# Patient Record
Sex: FEMALE | Race: WHITE | Employment: OTHER | URBAN - METROPOLITAN AREA
[De-identification: names, ages, dates, MRNs, and addresses within clinical notes are randomized per-mention and may not be internally consistent; named-entity substitution may affect disease eponyms.]

---

## 2017-06-12 PROCEDURE — 88175 CYTOPATH C/V AUTO FLUID REDO: CPT | Performed by: FAMILY MEDICINE

## 2017-06-12 PROCEDURE — 82607 VITAMIN B-12: CPT | Performed by: FAMILY MEDICINE

## 2017-06-12 PROCEDURE — 87624 HPV HI-RISK TYP POOLED RSLT: CPT | Performed by: FAMILY MEDICINE

## 2017-09-26 ENCOUNTER — TELEPHONE (OUTPATIENT)
Dept: FAMILY MEDICINE CLINIC | Facility: CLINIC | Age: 57
End: 2017-09-26

## 2017-09-26 NOTE — TELEPHONE ENCOUNTER
Chart reviewed for upcoming appointment at [facility]  Future Appointments  Date Time Provider Angélica Marina   10/12/2017 2:00 PM PATI VásquezWEANNIE EM[facility]   11/8/2017 3:40 PM PATI Vásquez EMGWEANNIE [facility]

## 2017-10-12 ENCOUNTER — OFFICE VISIT (OUTPATIENT)
Dept: INTERNAL MEDICINE CLINIC | Facility: CLINIC | Age: 57
End: 2017-10-12

## 2017-10-12 VITALS
SYSTOLIC BLOOD PRESSURE: 120 MMHG | HEART RATE: 80 BPM | HEIGHT: 63.5 IN | BODY MASS INDEX: 42.87 KG/M2 | DIASTOLIC BLOOD PRESSURE: 86 MMHG | WEIGHT: 245 LBS | RESPIRATION RATE: 16 BRPM

## 2017-10-12 DIAGNOSIS — F32.A ANXIETY AND DEPRESSION: ICD-10-CM

## 2017-10-12 DIAGNOSIS — F41.9 ANXIETY AND DEPRESSION: ICD-10-CM

## 2017-10-12 DIAGNOSIS — E55.9 VITAMIN D DEFICIENCY: ICD-10-CM

## 2017-10-12 DIAGNOSIS — I10 ESSENTIAL HYPERTENSION, BENIGN: ICD-10-CM

## 2017-10-12 DIAGNOSIS — R73.01 IFG (IMPAIRED FASTING GLUCOSE): ICD-10-CM

## 2017-10-12 DIAGNOSIS — E53.8 LOW VITAMIN B12 LEVEL: ICD-10-CM

## 2017-10-12 DIAGNOSIS — R53.82 CHRONIC FATIGUE: ICD-10-CM

## 2017-10-12 DIAGNOSIS — E66.01 MORBID OBESITY WITH BMI OF 40.0-44.9, ADULT (HCC): ICD-10-CM

## 2017-10-12 DIAGNOSIS — G47.33 OSA ON CPAP: ICD-10-CM

## 2017-10-12 DIAGNOSIS — Z99.89 OSA ON CPAP: ICD-10-CM

## 2017-10-12 DIAGNOSIS — R01.1 HEART MURMUR: ICD-10-CM

## 2017-10-12 DIAGNOSIS — R93.1 ABNORMAL ECHOCARDIOGRAM: ICD-10-CM

## 2017-10-12 DIAGNOSIS — Z51.81 ENCOUNTER FOR THERAPEUTIC DRUG MONITORING: Primary | ICD-10-CM

## 2017-10-12 PROCEDURE — 99203 OFFICE O/P NEW LOW 30 MIN: CPT | Performed by: NURSE PRACTITIONER

## 2017-10-12 PROCEDURE — 93000 ELECTROCARDIOGRAM COMPLETE: CPT | Performed by: NURSE PRACTITIONER

## 2017-10-12 NOTE — PATIENT INSTRUCTIONS
Welcome to the [facility]. ..your Lifestyle Renovation begins now! Thank you for placing your trust in our health care team, I look forward to working with you along this journey to better health!     Next steps:     1.  Schedule a personal n little time commitment. Don't forget to schedule your 1 month follow-up visit!

## 2017-10-12 NOTE — PROGRESS NOTES
Pio Florentino is a 62year old female new to our office today. Referred by daughter- previous/current patient of [facility]. S:  Patient presents today with c/o weight staring after 2nd pregnancy.   Patient sees excess weight as having a severe effect on hea 80   Resp 16   Ht 63.5\"   Wt 245 lb   LMP 09/01/2010   BMI 42.72 kg/m² , Percent body fat: F >32% 48%  GENERAL: well developed, well nourished, in no apparent distress, morbid obesity  SKIN: warm, pink, dry without rashes to exposed area  EYES: conjunctiv CARD ECHO 2D DOPPLER (CPT=93306); Future    5. Chronic fatigue  - check labs  - LEPTIN, SERUM; Future  - HEMOGLOBIN A1C  - VITAMIN B12  - OP REFERRAL TO [facility]  - DIETITIAN EDUCATION INITIAL, DIET (INTERNAL)    6.  Abnormal echocardiogram  - hx of elevate work on the following dietary changes this first month:    1. Drink water with meals and throughout the day, cut down on soda and/or juice if consumed.   2.  Eat breakfast daily and focus on having protein with each meal, examples include: greek yogurt, co

## 2017-10-18 ENCOUNTER — HOSPITAL ENCOUNTER (OUTPATIENT)
Dept: CV DIAGNOSTICS | Facility: HOSPITAL | Age: 57
Discharge: HOME | End: 2017-10-18
Attending: NURSE PRACTITIONER
Payer: MEDICARE

## 2017-10-18 DIAGNOSIS — Z99.89 OSA ON CPAP: ICD-10-CM

## 2017-10-18 DIAGNOSIS — G47.33 OSA ON CPAP: ICD-10-CM

## 2017-10-18 DIAGNOSIS — R93.1 ABNORMAL ECHOCARDIOGRAM: ICD-10-CM

## 2017-10-18 DIAGNOSIS — R01.1 HEART MURMUR: ICD-10-CM

## 2017-10-18 PROCEDURE — 93306 TTE W/DOPPLER COMPLETE: CPT | Performed by: NURSE PRACTITIONER

## 2017-10-24 ENCOUNTER — OFFICE VISIT (OUTPATIENT)
Dept: INTERNAL MEDICINE CLINIC | Facility: CLINIC | Age: 57
End: 2017-10-24

## 2017-10-24 VITALS — BODY MASS INDEX: 43 KG/M2 | WEIGHT: 245 LBS

## 2017-10-24 DIAGNOSIS — E66.01 OBESITY, CLASS III, BMI 40-49.9 (MORBID OBESITY) (HCC): ICD-10-CM

## 2017-10-24 PROCEDURE — 97802 MEDICAL NUTRITION INDIV IN: CPT | Performed by: DIETITIAN, REGISTERED

## 2017-10-25 NOTE — PROGRESS NOTES
INITIAL OUTPATIENT NUTRITION CONSULTATION    Nutrition Assessment    Medical Diagnosis: Obesity    Physical Findings: fatigue, chronic pain, failed knee replacement    Client Age and Gender: 62year old female    Marital Status and Occupation: Separate TABLET BY MOUTH ONCE DAILY Disp: 90 tablet Rfl: 3   naproxen 500 MG Oral Tab TAKE ONE TABLET BY  DAILY WITH MEALS. Disp: 30 tablet Rfl: 2   Meclizine HCl 25 MG Oral Tab Take 1 tablet (25 mg total) by mouth 3 (three) times daily as needed.  Disp: 45 tablet R Intake: No diet record  Breakfast: Skips due to waking after 10 am  Lunch: Varies. Did not specify  Dinner: [employer] [place]. Yesterday 1/2 kait at [facility].   Prior day ribs and pizza  Snacks: none mentioned  Beverages: Diet Pepsi, coffee with sug

## 2017-11-07 ENCOUNTER — OFFICE VISIT (OUTPATIENT)
Dept: INTERNAL MEDICINE CLINIC | Facility: CLINIC | Age: 57
End: 2017-11-07

## 2017-11-07 VITALS
TEMPERATURE: 80 F | BODY MASS INDEX: 42.35 KG/M2 | WEIGHT: 242 LBS | HEIGHT: 63.5 IN | SYSTOLIC BLOOD PRESSURE: 120 MMHG | DIASTOLIC BLOOD PRESSURE: 88 MMHG | RESPIRATION RATE: 16 BRPM

## 2017-11-07 DIAGNOSIS — Z51.81 ENCOUNTER FOR THERAPEUTIC DRUG MONITORING: Primary | ICD-10-CM

## 2017-11-07 DIAGNOSIS — G43.909 MIGRAINE WITHOUT STATUS MIGRAINOSUS, NOT INTRACTABLE, UNSPECIFIED MIGRAINE TYPE: ICD-10-CM

## 2017-11-07 DIAGNOSIS — E66.01 MORBID OBESITY WITH BMI OF 40.0-44.9, ADULT (HCC): ICD-10-CM

## 2017-11-07 DIAGNOSIS — Z98.84 S/P GASTRIC BYPASS: ICD-10-CM

## 2017-11-07 DIAGNOSIS — I10 ESSENTIAL HYPERTENSION, BENIGN: ICD-10-CM

## 2017-11-07 DIAGNOSIS — G89.4 CHRONIC PAIN SYNDROME: ICD-10-CM

## 2017-11-07 PROCEDURE — 99214 OFFICE O/P EST MOD 30 MIN: CPT | Performed by: NURSE PRACTITIONER

## 2017-11-07 RX ORDER — TOPIRAMATE 25 MG/1
25 TABLET ORAL 2 TIMES DAILY
Qty: 60 TABLET | Refills: 0 | Status: SHIPPED | OUTPATIENT
Start: 2017-11-07 | End: 2017-12-12

## 2017-11-07 NOTE — PATIENT INSTRUCTIONS
Congratulations on your 3# weight loss! Continue making lifestyle changes that focus on good nutrition, regular exercise and stress management.     Diagnostic results: cardiac echo without change since last test    Medication Plan: Continue Metformin at cur

## 2017-11-07 NOTE — PROGRESS NOTES
Pedrito Salgado is a 62year old female presents today for 1 month follow-up on medical weight loss program for the treatment of overweight, obesity, or morbid obesity with HTN, OA knees, migraines, hx of RYGB.     S:  Current weight Wt Readings from Last 40.0-44.9, adult (hcc)  Essential hypertension, benign  Migraine without status migrainosus, not intractable, unspecified migraine type  Chronic pain syndrome  S/p gastric bypass    No orders of the defined types were placed in this encounter.       Meds & www.CityHeroes.tenfarms, www.QirefitSpotMe. tenfarms  · 360FIT [place]    Online  · Fitness  on Paperhater.com in 10 DVD series   www. ShareSquare. tenfarms  · Sit and Be Fit - Chair exercise series www.sitandbefit. org      Apps  · [employer] - on the go grou

## 2017-12-12 ENCOUNTER — OFFICE VISIT (OUTPATIENT)
Dept: INTERNAL MEDICINE CLINIC | Facility: CLINIC | Age: 57
End: 2017-12-12

## 2017-12-12 VITALS
WEIGHT: 241 LBS | BODY MASS INDEX: 42.17 KG/M2 | HEART RATE: 68 BPM | DIASTOLIC BLOOD PRESSURE: 80 MMHG | RESPIRATION RATE: 16 BRPM | SYSTOLIC BLOOD PRESSURE: 130 MMHG | HEIGHT: 63.5 IN

## 2017-12-12 DIAGNOSIS — G43.909 MIGRAINE WITHOUT STATUS MIGRAINOSUS, NOT INTRACTABLE, UNSPECIFIED MIGRAINE TYPE: ICD-10-CM

## 2017-12-12 DIAGNOSIS — Z51.81 ENCOUNTER FOR THERAPEUTIC DRUG MONITORING: Primary | ICD-10-CM

## 2017-12-12 DIAGNOSIS — E66.01 MORBID OBESITY WITH BMI OF 40.0-44.9, ADULT (HCC): ICD-10-CM

## 2017-12-12 PROCEDURE — 99213 OFFICE O/P EST LOW 20 MIN: CPT | Performed by: NURSE PRACTITIONER

## 2017-12-12 RX ORDER — TOPIRAMATE 25 MG/1
25 TABLET ORAL 2 TIMES DAILY
Qty: 60 TABLET | Refills: 0 | Status: SHIPPED | OUTPATIENT
Start: 2017-12-12 | End: 2018-05-07 | Stop reason: ALTCHOICE

## 2017-12-12 NOTE — PROGRESS NOTES
Yovanny Rasmussen is a 62year old female presents today for 2 month follow-up on medical weight loss program for the treatment of overweight, obesity, or morbid obesity with HTN, OA knees, migraines, hx of RYGB.     S:  Current weight Wt Readings from Last Morbid obesity with bmi of 40.0-44.9, adult (hcc)  Migraine without status migrainosus, not intractable, unspecified migraine type    No orders of the defined types were placed in this encounter.       Meds & Refills for this Visit:  Signed Prescriptions Sue Barber A good rule of thumb: Devote half your plate to vegetables and green salad. Split the other half between protein and starchy carbohydrates. Fruit makes a good dessert. Servings and portions. What’s the difference? These terms can be very confusing.  But If your weight is a concern, reducing your portions can help. You can eat more than one serving of a food at once. But to keep from eating too much at one meal, learn how to manage your portions. A portion is the amount of each type of food on your plate. Try not to put off eating for too long. Waiting until level 1 or 2 — when you are starving and unable to concentrate — may lead to overeating. When you first start to feel any of the symptoms listed above, you should probably start to think about eating.

## 2017-12-12 NOTE — PATIENT INSTRUCTIONS
Congratulations on your 1# weight loss! Continue making lifestyle changes that focus on good nutrition, regular exercise and stress management. Medication Plan: Continue Metformin at current dose. Add Topamax.  Start Topamax at 1 tab daily before breakfa One teaspoon is about the size of the tip of your thumb. Keeping track of serving sizes  When you’re planning for a snack or a meal, keep servings in mind.  If you don’t have measuring cups or a scale handy, there are ways to [place] serving sizes, such Low energy/fatigue   Difficulty concentrating  How Does the Scale Work? Before you eat, take a moment to rate your hunger. Think about how hungry you physically feel. Your goal is to eat between levels 4 and 6.  This means you are eating when you are hungr

## 2018-12-19 PROBLEM — M25.512 CHRONIC LEFT SHOULDER PAIN: Status: ACTIVE | Noted: 2018-12-19

## 2018-12-19 PROBLEM — G89.29 CHRONIC LEFT SHOULDER PAIN: Status: ACTIVE | Noted: 2018-12-19

## 2018-12-19 PROBLEM — M79.602 PAIN OF LEFT UPPER EXTREMITY: Status: ACTIVE | Noted: 2018-12-19

## 2019-05-19 PROCEDURE — 87086 URINE CULTURE/COLONY COUNT: CPT | Performed by: FAMILY MEDICINE

## 2020-01-03 PROBLEM — I70.0 ATHEROSCLEROSIS OF AORTA (HCC): Status: ACTIVE | Noted: 2020-01-03

## 2020-01-03 PROBLEM — I70.0 ATHEROSCLEROSIS OF AORTA: Status: ACTIVE | Noted: 2020-01-03

## 2020-10-28 PROBLEM — R43.9 DYSOSMIA: Status: ACTIVE | Noted: 2020-10-28

## 2021-02-05 PROBLEM — I27.21 PAH (PULMONARY ARTERY HYPERTENSION) (HCC): Status: ACTIVE | Noted: 2021-02-05

## 2021-02-18 PROBLEM — G89.29 CHRONIC BILATERAL THORACIC BACK PAIN: Status: ACTIVE | Noted: 2021-02-18

## 2021-02-18 PROBLEM — M54.6 CHRONIC BILATERAL THORACIC BACK PAIN: Status: ACTIVE | Noted: 2021-02-18

## 2021-02-18 PROBLEM — M47.816 LUMBAR FACET ARTHROPATHY: Status: ACTIVE | Noted: 2021-02-18

## 2021-03-01 ENCOUNTER — OFFICE VISIT (OUTPATIENT)
Dept: FAMILY MEDICINE CLINIC | Facility: CLINIC | Age: 61
End: 2021-03-01
Payer: MEDICARE

## 2021-03-01 VITALS
BODY MASS INDEX: 40.75 KG/M2 | SYSTOLIC BLOOD PRESSURE: 138 MMHG | OXYGEN SATURATION: 98 % | DIASTOLIC BLOOD PRESSURE: 70 MMHG | RESPIRATION RATE: 18 BRPM | WEIGHT: 230 LBS | HEIGHT: 63 IN | HEART RATE: 70 BPM | TEMPERATURE: 98 F

## 2021-03-01 DIAGNOSIS — Z20.822 SUSPECTED COVID-19 VIRUS INFECTION: ICD-10-CM

## 2021-03-01 DIAGNOSIS — J02.9 SORE THROAT: Primary | ICD-10-CM

## 2021-03-01 LAB
CONTROL LINE PRESENT WITH A CLEAR BACKGROUND (YES/NO): PRESENT YES/NO
KIT LOT #: NORMAL NUMERIC
STREP GRP A CUL-SCR: NEGATIVE

## 2021-03-01 PROCEDURE — 87880 STREP A ASSAY W/OPTIC: CPT | Performed by: NURSE PRACTITIONER

## 2021-03-01 PROCEDURE — 99213 OFFICE O/P EST LOW 20 MIN: CPT | Performed by: NURSE PRACTITIONER

## 2021-03-01 NOTE — PATIENT INSTRUCTIONS
1. Rest. Drink plenty of fluids. 2. Tylenol/Ibuprofen for pain/fevers. 3. Salt water gargles three times daily  4. Use humidifier at home when possible. 5. The rapid strep test was negative today.  We will send a throat culture to lab and call you with rony treatment for you. The evaluation may include a health history, physical exam, and diagnostic tests. Health history  Your healthcare provider may ask you the following:   · How long has the sore throat lasted and how have you been treating it?   · Do you air moist and relieve throat dryness. · Try over-the-counter pain relievers such as acetaminophen or ibuprofen. Use as directed, and don’t exceed the recommended dose. Don’t give aspirin to children under age19. Are antibiotics needed?   If your sore th immediately. Any of these could signal an allergic reaction to antibiotics. · Symptoms don’t improve within a week. · Symptoms don’t improve within  2 to 3  days of starting antibiotics.   Call 911  Call 911 if any of the following occur:   · Trouble rudy

## 2021-03-01 NOTE — PROGRESS NOTES
Maximiliano Geronimo CHIEF COMPLAINT:   Patient presents with:  Cough: x 4 days. No fevers. Runny nose. Body aches      HPI:   Armida Hale is a 64year old female who presents for covid-19 testing. Patient reports cough, runny nose, and body aches x 4 days.   Denies any • Valvular disease     pt states leaking valves /no problems   • Vertigo       Past Surgical History:   Procedure Laterality Date   • APPENDECTOMY  1962   • CERVICAL EPIDURAL N/A 5/13/2016    Performed by Carlos Chacon MD at [address] [place] [address] Performed by Judi Mccord MD at [address] [place] [address] History    Tobacco Use      Smoking status: Never Smoker      Smokeless tobacco: Never Used    Alcohol use: Not Currently      Alcohol/week: 1.0 - 2.0 standard drinks      Ty Rapid strep negative. Discussed physical exam and hpi with pt. Pt has reassuring physical exam consistent with pharyngitis and mild viral uri symptoms. Lungs clear bilat. No respiratory distress noted.  We discussed covid-19 vs strep vs other etiologi · Irritants such as tobacco smoke or air pollution  · Acid reflux  A healthy throat  The tonsils are on the sides of the throat near the base of the tongue. They collect viruses and bacteria and help fight infection.  The throat (pharynx) is the passage for Treating a sore throat  Treatment depends on many factors. What is the likely cause? Is the problem recent? Does it keep coming back? In many cases, the best thing to do is to treat the symptoms, rest, and let the problem heal itself.  Antibiotics may help In some cases, tonsils need to be removed. This is often done as outpatient (same-day) surgery. Your healthcare provider may advise removing the tonsils in cases of:   · Several severe bouts of tonsillitis in a year.  “Severe” episodes include those that le

## 2021-03-03 LAB — SARS-COV-2 RNA RESP QL NAA+PROBE: NOT DETECTED

## 2021-05-21 ENCOUNTER — APPOINTMENT (OUTPATIENT)
Dept: GENERAL RADIOLOGY | Facility: HOSPITAL | Age: 61
End: 2021-05-21
Attending: INTERNAL MEDICINE
Payer: MEDICARE

## 2021-05-21 ENCOUNTER — APPOINTMENT (OUTPATIENT)
Dept: CT IMAGING | Facility: HOSPITAL | Age: 61
End: 2021-05-21
Attending: EMERGENCY MEDICINE
Payer: MEDICARE

## 2021-05-21 ENCOUNTER — ANESTHESIA (OUTPATIENT)
Dept: SURGERY | Facility: HOSPITAL | Age: 61
End: 2021-05-21
Payer: MEDICARE

## 2021-05-21 ENCOUNTER — ANESTHESIA EVENT (OUTPATIENT)
Dept: SURGERY | Facility: HOSPITAL | Age: 61
End: 2021-05-21
Payer: MEDICARE

## 2021-05-21 ENCOUNTER — HOSPITAL ENCOUNTER (OUTPATIENT)
Facility: HOSPITAL | Age: 61
Setting detail: OBSERVATION
Discharge: HOME | End: 2021-05-22
Attending: EMERGENCY MEDICINE | Admitting: INTERNAL MEDICINE
Payer: MEDICARE

## 2021-05-21 DIAGNOSIS — N23 RENAL COLIC: Primary | ICD-10-CM

## 2021-05-21 DIAGNOSIS — N20.1 URETERAL CALCULI: ICD-10-CM

## 2021-05-21 PROCEDURE — 82365 CALCULUS SPECTROSCOPY: CPT | Performed by: UROLOGY

## 2021-05-21 PROCEDURE — 96374 THER/PROPH/DIAG INJ IV PUSH: CPT

## 2021-05-21 PROCEDURE — 96376 TX/PRO/DX INJ SAME DRUG ADON: CPT

## 2021-05-21 PROCEDURE — 99285 EMERGENCY DEPT VISIT HI MDM: CPT

## 2021-05-21 PROCEDURE — 74176 CT ABD & PELVIS W/O CONTRAST: CPT | Performed by: EMERGENCY MEDICINE

## 2021-05-21 PROCEDURE — 80053 COMPREHEN METABOLIC PANEL: CPT | Performed by: EMERGENCY MEDICINE

## 2021-05-21 PROCEDURE — 0T778DZ DILATION OF LEFT URETER WITH INTRALUMINAL DEVICE, VIA NATURAL OR ARTIFICIAL OPENING ENDOSCOPIC: ICD-10-PCS | Performed by: UROLOGY

## 2021-05-21 PROCEDURE — 88300 SURGICAL PATH GROSS: CPT | Performed by: INTERNAL MEDICINE

## 2021-05-21 PROCEDURE — 85025 COMPLETE CBC W/AUTO DIFF WBC: CPT | Performed by: EMERGENCY MEDICINE

## 2021-05-21 PROCEDURE — 93005 ELECTROCARDIOGRAM TRACING: CPT

## 2021-05-21 PROCEDURE — S0028 INJECTION, FAMOTIDINE, 20 MG: HCPCS | Performed by: EMERGENCY MEDICINE

## 2021-05-21 PROCEDURE — 96361 HYDRATE IV INFUSION ADD-ON: CPT

## 2021-05-21 PROCEDURE — 81001 URINALYSIS AUTO W/SCOPE: CPT | Performed by: EMERGENCY MEDICINE

## 2021-05-21 PROCEDURE — 0TC78ZZ EXTIRPATION OF MATTER FROM LEFT URETER, VIA NATURAL OR ARTIFICIAL OPENING ENDOSCOPIC: ICD-10-PCS | Performed by: UROLOGY

## 2021-05-21 PROCEDURE — 87086 URINE CULTURE/COLONY COUNT: CPT | Performed by: EMERGENCY MEDICINE

## 2021-05-21 PROCEDURE — 96375 TX/PRO/DX INJ NEW DRUG ADDON: CPT

## 2021-05-21 PROCEDURE — 93010 ELECTROCARDIOGRAM REPORT: CPT

## 2021-05-21 DEVICE — URETERAL STENT
Type: IMPLANTABLE DEVICE | Site: URETER | Status: FUNCTIONAL
Brand: ASCERTA™

## 2021-05-21 RX ORDER — METOCLOPRAMIDE HYDROCHLORIDE 5 MG/ML
10 INJECTION INTRAMUSCULAR; INTRAVENOUS AS NEEDED
Status: DISCONTINUED | OUTPATIENT
Start: 2021-05-21 | End: 2021-05-21 | Stop reason: HOSPADM

## 2021-05-21 RX ORDER — KETOROLAC TROMETHAMINE 30 MG/ML
15 INJECTION, SOLUTION INTRAMUSCULAR; INTRAVENOUS ONCE
Status: COMPLETED | OUTPATIENT
Start: 2021-05-21 | End: 2021-05-21

## 2021-05-21 RX ORDER — ONDANSETRON 2 MG/ML
4 INJECTION INTRAMUSCULAR; INTRAVENOUS AS NEEDED
Status: DISCONTINUED | OUTPATIENT
Start: 2021-05-21 | End: 2021-05-21 | Stop reason: HOSPADM

## 2021-05-21 RX ORDER — ATENOLOL 50 MG/1
50 TABLET ORAL NIGHTLY
Status: DISCONTINUED | OUTPATIENT
Start: 2021-05-21 | End: 2021-05-22

## 2021-05-21 RX ORDER — SODIUM CHLORIDE, SODIUM LACTATE, POTASSIUM CHLORIDE, CALCIUM CHLORIDE 600; 310; 30; 20 MG/100ML; MG/100ML; MG/100ML; MG/100ML
INJECTION, SOLUTION INTRAVENOUS CONTINUOUS
Status: DISCONTINUED | OUTPATIENT
Start: 2021-05-21 | End: 2021-05-21 | Stop reason: HOSPADM

## 2021-05-21 RX ORDER — LIDOCAINE HYDROCHLORIDE 10 MG/ML
INJECTION, SOLUTION EPIDURAL; INFILTRATION; INTRACAUDAL; PERINEURAL AS NEEDED
Status: DISCONTINUED | OUTPATIENT
Start: 2021-05-21 | End: 2021-05-21 | Stop reason: SURG

## 2021-05-21 RX ORDER — SODIUM CHLORIDE 9 MG/ML
INJECTION, SOLUTION INTRAVENOUS CONTINUOUS
Status: DISCONTINUED | OUTPATIENT
Start: 2021-05-21 | End: 2021-05-22

## 2021-05-21 RX ORDER — PROCHLORPERAZINE EDISYLATE 5 MG/ML
5 INJECTION INTRAMUSCULAR; INTRAVENOUS EVERY 8 HOURS PRN
Status: DISCONTINUED | OUTPATIENT
Start: 2021-05-21 | End: 2021-05-22

## 2021-05-21 RX ORDER — ATROPINE SULFATE 0.4 MG/ML
AMPUL (ML) INJECTION AS NEEDED
Status: DISCONTINUED | OUTPATIENT
Start: 2021-05-21 | End: 2021-05-21 | Stop reason: SURG

## 2021-05-21 RX ORDER — HYDROMORPHONE HYDROCHLORIDE 1 MG/ML
0.5 INJECTION, SOLUTION INTRAMUSCULAR; INTRAVENOUS; SUBCUTANEOUS EVERY 30 MIN PRN
Status: DISCONTINUED | OUTPATIENT
Start: 2021-05-21 | End: 2021-05-21 | Stop reason: HOSPADM

## 2021-05-21 RX ORDER — HYDROCODONE BITARTRATE AND ACETAMINOPHEN 5; 325 MG/1; MG/1
2 TABLET ORAL AS NEEDED
Status: DISCONTINUED | OUTPATIENT
Start: 2021-05-21 | End: 2021-05-21 | Stop reason: HOSPADM

## 2021-05-21 RX ORDER — MEPERIDINE HYDROCHLORIDE 25 MG/ML
12.5 INJECTION INTRAMUSCULAR; INTRAVENOUS; SUBCUTANEOUS AS NEEDED
Status: DISCONTINUED | OUTPATIENT
Start: 2021-05-21 | End: 2021-05-21 | Stop reason: HOSPADM

## 2021-05-21 RX ORDER — KETOROLAC TROMETHAMINE 30 MG/ML
30 INJECTION, SOLUTION INTRAMUSCULAR; INTRAVENOUS EVERY 6 HOURS PRN
Status: DISCONTINUED | OUTPATIENT
Start: 2021-05-21 | End: 2021-05-21

## 2021-05-21 RX ORDER — BISACODYL 10 MG
10 SUPPOSITORY, RECTAL RECTAL
Status: DISCONTINUED | OUTPATIENT
Start: 2021-05-21 | End: 2021-05-22

## 2021-05-21 RX ORDER — HYDROCODONE BITARTRATE AND ACETAMINOPHEN 5; 325 MG/1; MG/1
1 TABLET ORAL AS NEEDED
Status: DISCONTINUED | OUTPATIENT
Start: 2021-05-21 | End: 2021-05-21 | Stop reason: HOSPADM

## 2021-05-21 RX ORDER — ROCURONIUM BROMIDE 10 MG/ML
INJECTION, SOLUTION INTRAVENOUS AS NEEDED
Status: DISCONTINUED | OUTPATIENT
Start: 2021-05-21 | End: 2021-05-21 | Stop reason: SURG

## 2021-05-21 RX ORDER — HYDROMORPHONE HYDROCHLORIDE 1 MG/ML
0.4 INJECTION, SOLUTION INTRAMUSCULAR; INTRAVENOUS; SUBCUTANEOUS EVERY 2 HOUR PRN
Status: DISCONTINUED | OUTPATIENT
Start: 2021-05-21 | End: 2021-05-22

## 2021-05-21 RX ORDER — POLYETHYLENE GLYCOL 3350 17 G/17G
17 POWDER, FOR SOLUTION ORAL DAILY PRN
Status: DISCONTINUED | OUTPATIENT
Start: 2021-05-21 | End: 2021-05-22

## 2021-05-21 RX ORDER — MULTIVIT/IRON SULF/FOLIC ACID 15MG-0.4MG
1 TABLET ORAL DAILY
COMMUNITY
End: 2021-11-04 | Stop reason: ALTCHOICE

## 2021-05-21 RX ORDER — HYDROMORPHONE HYDROCHLORIDE 1 MG/ML
0.4 INJECTION, SOLUTION INTRAMUSCULAR; INTRAVENOUS; SUBCUTANEOUS EVERY 5 MIN PRN
Status: DISCONTINUED | OUTPATIENT
Start: 2021-05-21 | End: 2021-05-21 | Stop reason: HOSPADM

## 2021-05-21 RX ORDER — CEFAZOLIN SODIUM/WATER 2 G/20 ML
SYRINGE (ML) INTRAVENOUS AS NEEDED
Status: DISCONTINUED | OUTPATIENT
Start: 2021-05-21 | End: 2021-05-21 | Stop reason: SURG

## 2021-05-21 RX ORDER — TOPIRAMATE 25 MG/1
25 TABLET ORAL DAILY
Status: DISCONTINUED | OUTPATIENT
Start: 2021-05-21 | End: 2021-05-22

## 2021-05-21 RX ORDER — ONDANSETRON 2 MG/ML
4 INJECTION INTRAMUSCULAR; INTRAVENOUS EVERY 6 HOURS PRN
Status: DISCONTINUED | OUTPATIENT
Start: 2021-05-21 | End: 2021-05-22

## 2021-05-21 RX ORDER — GLYCOPYRROLATE 0.2 MG/ML
INJECTION, SOLUTION INTRAMUSCULAR; INTRAVENOUS AS NEEDED
Status: DISCONTINUED | OUTPATIENT
Start: 2021-05-21 | End: 2021-05-21 | Stop reason: SURG

## 2021-05-21 RX ORDER — SODIUM PHOSPHATE, DIBASIC AND SODIUM PHOSPHATE, MONOBASIC 7; 19 G/133ML; G/133ML
1 ENEMA RECTAL ONCE AS NEEDED
Status: DISCONTINUED | OUTPATIENT
Start: 2021-05-21 | End: 2021-05-22

## 2021-05-21 RX ORDER — MIDAZOLAM HYDROCHLORIDE 1 MG/ML
1 INJECTION INTRAMUSCULAR; INTRAVENOUS EVERY 5 MIN PRN
Status: DISCONTINUED | OUTPATIENT
Start: 2021-05-21 | End: 2021-05-21 | Stop reason: HOSPADM

## 2021-05-21 RX ORDER — MORPHINE SULFATE 2 MG/ML
2 INJECTION, SOLUTION INTRAMUSCULAR; INTRAVENOUS ONCE
Status: COMPLETED | OUTPATIENT
Start: 2021-05-21 | End: 2021-05-21

## 2021-05-21 RX ORDER — NEOSTIGMINE METHYLSULFATE 1 MG/ML
INJECTION INTRAVENOUS AS NEEDED
Status: DISCONTINUED | OUTPATIENT
Start: 2021-05-21 | End: 2021-05-21 | Stop reason: SURG

## 2021-05-21 RX ORDER — HYDROMORPHONE HYDROCHLORIDE 1 MG/ML
0.8 INJECTION, SOLUTION INTRAMUSCULAR; INTRAVENOUS; SUBCUTANEOUS EVERY 2 HOUR PRN
Status: DISCONTINUED | OUTPATIENT
Start: 2021-05-21 | End: 2021-05-22

## 2021-05-21 RX ORDER — HYDROMORPHONE HYDROCHLORIDE 1 MG/ML
0.2 INJECTION, SOLUTION INTRAMUSCULAR; INTRAVENOUS; SUBCUTANEOUS EVERY 2 HOUR PRN
Status: DISCONTINUED | OUTPATIENT
Start: 2021-05-21 | End: 2021-05-22

## 2021-05-21 RX ORDER — MORPHINE SULFATE 2 MG/ML
2 INJECTION, SOLUTION INTRAMUSCULAR; INTRAVENOUS
Status: DISCONTINUED | OUTPATIENT
Start: 2021-05-21 | End: 2021-05-22

## 2021-05-21 RX ORDER — NALOXONE HYDROCHLORIDE 0.4 MG/ML
80 INJECTION, SOLUTION INTRAMUSCULAR; INTRAVENOUS; SUBCUTANEOUS AS NEEDED
Status: DISCONTINUED | OUTPATIENT
Start: 2021-05-21 | End: 2021-05-21 | Stop reason: HOSPADM

## 2021-05-21 RX ORDER — DEXAMETHASONE SODIUM PHOSPHATE 4 MG/ML
8 VIAL (ML) INJECTION AS NEEDED
Status: DISCONTINUED | OUTPATIENT
Start: 2021-05-21 | End: 2021-05-21 | Stop reason: HOSPADM

## 2021-05-21 RX ORDER — EPHEDRINE SULFATE 50 MG/ML
INJECTION INTRAVENOUS AS NEEDED
Status: DISCONTINUED | OUTPATIENT
Start: 2021-05-21 | End: 2021-05-21 | Stop reason: SURG

## 2021-05-21 RX ORDER — CYCLOBENZAPRINE HCL 10 MG
10 TABLET ORAL NIGHTLY PRN
COMMUNITY
End: 2021-07-08

## 2021-05-21 RX ORDER — CEFAZOLIN SODIUM/WATER 2 G/20 ML
2 SYRINGE (ML) INTRAVENOUS
Status: COMPLETED | OUTPATIENT
Start: 2021-05-21 | End: 2021-05-21

## 2021-05-21 RX ORDER — FAMOTIDINE 10 MG/ML
20 INJECTION, SOLUTION INTRAVENOUS ONCE
Status: COMPLETED | OUTPATIENT
Start: 2021-05-21 | End: 2021-05-21

## 2021-05-21 RX ADMIN — LIDOCAINE HYDROCHLORIDE 50 MG: 10 INJECTION, SOLUTION EPIDURAL; INFILTRATION; INTRACAUDAL; PERINEURAL at 18:01:00

## 2021-05-21 RX ADMIN — EPHEDRINE SULFATE 10 MG: 50 INJECTION INTRAVENOUS at 18:24:00

## 2021-05-21 RX ADMIN — GLYCOPYRROLATE 0.6 MG: 0.2 INJECTION, SOLUTION INTRAMUSCULAR; INTRAVENOUS at 18:29:00

## 2021-05-21 RX ADMIN — EPHEDRINE SULFATE 10 MG: 50 INJECTION INTRAVENOUS at 18:21:00

## 2021-05-21 RX ADMIN — SODIUM CHLORIDE: 9 INJECTION, SOLUTION INTRAVENOUS at 18:45:00

## 2021-05-21 RX ADMIN — CEFAZOLIN SODIUM/WATER 2 G: 2 G/20 ML SYRINGE (ML) INTRAVENOUS at 18:07:00

## 2021-05-21 RX ADMIN — ROCURONIUM BROMIDE 25 MG: 10 INJECTION, SOLUTION INTRAVENOUS at 18:09:00

## 2021-05-21 RX ADMIN — ATROPINE SULFATE 0.4 MG: 0.4 MG/ML AMPUL (ML) INJECTION at 18:24:00

## 2021-05-21 RX ADMIN — NEOSTIGMINE METHYLSULFATE 3 MG: 1 INJECTION INTRAVENOUS at 18:29:00

## 2021-05-21 NOTE — ANESTHESIA PREPROCEDURE EVALUATION
PRE-OP EVALUATION    Patient Name: Narda Pearson    Admit Diagnosis: Renal colic [H17]    Pre-op Diagnosis: Ureteral calculi [N20.1]    CYSTOSCOPY,  LEFT RETROGRADE, PYELOGRAM, LEFT URETERAL STONE EXTRACTION WITH LASER LITHOTRIPSY, LEFT URETERAL STENT I Q8H PRN  ceFAZolin sodium (ANCEF/KEFZOL) 2 GM/20ML premix IV syringe 2 g, 2 g, Intravenous, On Call to OR  [facility] Hold] topiramate (Topamax) tab 25 mg, 25 mg, Oral, Daily  [MAR Hold] atenolol (TENORMIN) tab 50 mg, 50 mg, Oral, Nightly  cefTRIAXone Sodium (RO normal.      The estimated ejection fraction was 55-60%. Doppler parameters are      consistent with abnormal left ventricular relaxation - grade 1 diastolic      dysfunction. 2. Right ventricle:  The cavity size was normal. Systolic function was      nor left upper extremity     Atherosclerosis of aorta ([address]     Dysosmia     PAH (pulmonary artery hypertension) (HCC)     Chronic bilateral thoracic back pain     Lumbar facet arthropathy     Renal colic            Past Surgical History:   Procedure Lateralit Procedure: CERVICAL EPIDURAL;  Surgeon: Mary Petersen MD;  Location: [address] [place] [address]   • PATIENT DOCUMENTED NOT TO HAVE EXPERIENCED ANY OF THE FOLLOWING EVENTS N/A 5/13/2016    Procedure: CERVICAL EPIDURAL;  Surgeon: Mary Petersen MD;  L Date     05/21/2021    K 4.2 05/21/2021     05/21/2021    CO2 24.0 05/21/2021    BUN 26 (H) 05/21/2021    BUN 16.0 03/26/2021    CREATSERUM 1.32 (H) 05/21/2021    CREATSERUM 0.93 (H) 03/26/2021     (H) 05/21/2021    CA 9.7 05/21/2021

## 2021-05-21 NOTE — ED QUICK NOTES
Patient care assumed. Patient resting on cart. States pain is much better at this time. Dr. Rachel Davis at the bedside.

## 2021-05-21 NOTE — PROGRESS NOTES
Patient arrived on unit from ER. Reports improvement in pain. Some nausea, no emesis. Straining all urine per orders. Started on IVF. MD paged for admission orders. Cystoscopy scheduled for 1630 today.  Keeping patient NPO

## 2021-05-21 NOTE — CONSULTS
[facility] Urology   Consultation Note    Aaron Chino Patient Status:  Emergency    1/15/1960 MRN [id number]   Location [address] Attending Jaimee Torres MD   Hosp Day # 0 PCP Anita Corrales MD     Reason intubation     small/short airway   • Dizziness and giddiness     unknown etiology of vertigo   • Essential hypertension    • Fibromyalgia    • Hematuria    • Hx of diseases NEC      h/o parvovirus b 19   • Hx of diseases NEC     optical herpes   • HYPERTE meniscus pt cannot recall which knee   • OTHER SURGICAL HISTORY  06/20/14    Cystoscopy - Dr. Brooklynn Woodward    • OTHER SURGICAL HISTORY  04/05/2021    Cystoscopy w/ Dr Juwan Katz   • PATIENT DOCUMENTED NOT TO HAVE EXPERIENCED ANY OF THE FOLLOWING EVENTS N/A 3/11/ • Other (Other) Father    • Other (colon polyps) Father    • Hypertension Mother    • Heart Disorder Mother         mi   • Other (emphysema) Mother    • Cancer Paternal Grandfather         colon cancer   • Psychiatric Brother         substance abuse   • 05/21/2021    BILT 0.4 05/21/2021    TP 6.0 05/21/2021    AST 12 05/21/2021    ALT 16 05/21/2021     Serum calcium level 9.7 (has been elevated in the past)  --Serum calcium level 9/22/20: 10.6  -Serum calcium level 12/5/20: 11    UA 5/21/21: 11-20 WBC/hpf 40.0-44.9, adult (HCC)     Chronic pain syndrome     Other malaise and fatigue     Insomnia, unspecified     Knee internal derangement     Fibromyalgia     Depression     Post traumatic stress disorder     S/P gastric bypass     Vitamin D deficiency     Mi this is not possible or indicated and in those cases there may be need for future procedures to remove remaining stones.  Typically, the procedure causes some swelling in the ureter which can cause obstruction and pain, for that reason a ureteral stent is o call to OR    In the interim, continue with IV fluids, straining all urine, pain management, anti-emetic medication prn. Follow labs, temp.     Check PTH level  Patient to discuss alternative medication to topiramate since it can increase risk of kidney

## 2021-05-21 NOTE — PLAN OF CARE
Pt a/ox4. Intermittent nausea and stomach upset. Dry heaves with little emesis. Antiemetics given. IV dilaudid for pain control. Straining all urine per orders. Cystoscopy this evening for stone removal and stenting. IVF per orders.  Started on roceph

## 2021-05-21 NOTE — PROGRESS NOTES
[facility] Pharmacy Note: Antimicrobial Weight Based Dose Adjustment for: ceftriaxone (ROCEPHIN)    Renetta Aponte is a 64year old patient who has been prescribed ceftriaxone (ROCEPHIN) 1000 mg every 24 hours.     Estimated Creatinine Clearance: 37 mL/min (A) (

## 2021-05-21 NOTE — ED PROVIDER NOTES
Patient Seen in: [facility] Emergency Department      History   Patient presents with:  Abdomen/Flank Pain    Stated Complaint: lower abd pain    HPI/Subjective:   HPI    Left lower quadrant pain, intermittent, for the last 2 weeks.   Sometimes, can EPIDURAL/SUBARACHNOID; CERVICAL/THORACIC N/A 3/11/2016    Procedure: CERVICAL EPIDURAL;  Surgeon: Matias Carr MD;  Location: [facility]   • INJECTION, W/WO CONTRAST, DX/THERAPEUTIC SUBSTANCE, EPIDURAL/SUBARACHNOID; CERVICAL/THORACIC [place] PREOPERATIVE ORDER FOR IV ANTIBIOTIC SURGICAL SITE INFECTION PROPHYLAXIS.  N/A 4/4/2016    Procedure: CERVICAL EPIDURAL;  Surgeon: Adrienne Perez MD;  Location: [facility] PAIN [facility]NAGEMENT   • PATIENT [address] FOR IV ANTIBIOT guarding. Musculoskeletal: No swelling, deformity or ecchymosis. Neurological: Pt is alert and oriented to person, place, and time. No cranial nerve deficit. Skin: Skin is warm and dry. Psychiatric: Normal mood and affect.  Thought content norm scan shows kidney stone as noted above. Discussed with the patient her blood work, her UA results. She feels her pain is returning and is uncomfortable going home today. Discussed with Dr. Diomedes Escalera, urology on-call.     Advises admission to the hosp

## 2021-05-21 NOTE — ED INITIAL ASSESSMENT (HPI)
61YF c/c of abd pain Pt state that she been having abd pain for the last several days Pt state the pain is also in her lower niko.  Pt state that she also having pain after urination (0) Answers both questions correctly

## 2021-05-21 NOTE — RESPIRATORY THERAPY NOTE
Patient is refusing cpap usage at this time. [place] score of 2 indicates  high risk for sleep apnea.

## 2021-05-21 NOTE — ANESTHESIA POSTPROCEDURE EVALUATION
Donavon Edwards Patient Status:  Observation   Age/Gender 64year old female MRN [id number]   [facility] SURGERY Attending Christiano Merchant MD   Hosp Day # 0 PCP Anita Corrales MD       Anesthesia Post-op Note responsible recovery RN. Report to José Verde RN. Dental Exam: Unchanged from Preop    Patient to be discharged from PACU when criteria met.

## 2021-05-21 NOTE — H&P
[facility] Hospitalist History and Physical      Patient presents with:  Abdomen/Flank Pain       PCP: Rigoberto Camara MD      History of Present Illness: Patient is a 64year old female with PMH sig for anxiety/dpression, fibromyalgia, HTN, obesity, [facility] MANAGEMENT   • INJECTION, W/WO CONTRAST, DX/THERAPEUTIC SUBSTANCE, EPIDURAL/SUBARACHNOID; CERVICAL/THORACIC N/A 5/13/2016    Procedure: CERVICAL EPIDURAL;  Surgeon: Amaris Leonard MD;  Location: [address] [place] [address]   • KNEE REPLACEMENT SURGERY Mikaela Aquino MD;  Location: [address] [place] [address]   • REMOVAL GALLBLADDER     • REMOVAL OF TONSILS,12+ Y/O     • SPECIAL SERVICE OR REPORT      appendectomy   • SPECIAL SERVICE OR REPORT      gastric bypass sx   • SPECIAL SERVICE OR REPORT      back cyst No drainage.    Throat: Lips, mucosa, and tongue normal. Teeth and gums normal.   Neck: Supple, symmetrical, trachea midline, no cervical or supraclavicular lymph adenopathy, thyroid: no enlargment/tenderness/nodules appreciated   Lungs:   Clear to ausculta low back pain. FINDINGS: Evaluation of the visceral organs is limited due to the lack of IV contrast. LUNG BASE:  Unremarkable. LIVER:  Unremarkable. BILIARY:  Status post cholecystectomy SPLEEN:  Unremarkable. PANCREAS:  Unremarkable.  ADRENALS:  Jennifer Prudent minutes taken with patient and coordinating care. Greater than 50% face to face encounter.       Juan Trent MD  [facility] Hospitalist  [phone]    **Certification      PHYSICIAN Certification of Need for Inpatient Hospitalization - Initial Certification

## 2021-05-22 VITALS
OXYGEN SATURATION: 97 % | DIASTOLIC BLOOD PRESSURE: 83 MMHG | TEMPERATURE: 98 F | RESPIRATION RATE: 18 BRPM | HEART RATE: 57 BPM | SYSTOLIC BLOOD PRESSURE: 125 MMHG | BODY MASS INDEX: 40 KG/M2 | WEIGHT: 225 LBS

## 2021-05-22 PROCEDURE — 83970 ASSAY OF PARATHORMONE: CPT | Performed by: UROLOGY

## 2021-05-22 PROCEDURE — 80048 BASIC METABOLIC PNL TOTAL CA: CPT | Performed by: UROLOGY

## 2021-05-22 RX ORDER — CEPHALEXIN 500 MG/1
500 CAPSULE ORAL 3 TIMES DAILY
Qty: 9 CAPSULE | Refills: 0 | Status: SHIPPED | OUTPATIENT
Start: 2021-05-22 | End: 2021-05-25

## 2021-05-22 NOTE — PLAN OF CARE
POD 1 kidney stone retrieval w/ stent placement, Pt is AAOX4, room air, VSS, IVF, voiding freely to restroom, up SBA, minimal pain, Pt doing well, ready to go home, all needs met, all safety measures in place, call light within reach, will CTM.

## 2021-05-22 NOTE — PROGRESS NOTES
[facility]  Urology Progress Note    Radha Galvin Patient Status:  Observation    1/15/1960 MRN [id number]   [facility] 3SW-A Attending Desiree Naidu MD   Hosp Day # 0 PCP Marcie Cueto MD     Subjective:  Tyrone Plummer

## 2021-05-22 NOTE — DISCHARGE SUMMARY
General Medicine Discharge Summary     Patient ID:  Andie Lozano  64year old  1/15/1960    Admit date: 5/21/2021    Discharge date and time: 5/22/2021    Attending Physician: Karan Cortez (Left)       Patient instructions:      Current Discharge Medication List    START taking these medications    cephALEXin 500 MG Oral Cap  Take 1 capsule (500 mg total) by mouth 3 (three) times daily for 3 days.       CONTINUE these medications which have N

## 2021-05-22 NOTE — OPERATIVE REPORT
[facility]    PATIENT'S NAME: Jl Woodruff   ATTENDING PHYSICIAN: Vicente Christine M.D. OPERATING PHYSICIAN: Celine Batista M.D.    PATIENT ACCOUNT#:   [de-identified]    LOCATION:  [address] [place] [address]  MEDICAL RECORD #:   [id number]       DATE OF BIRTH: ureter. The wire was removed. The stone was identified. It was too large to extract. Holmium laser was used to fracture the stone into multiple pieces. The fragments were extracted and sent to Pathology.   The ureter appeared slightly swollen and as a

## 2021-05-22 NOTE — PLAN OF CARE
NURSING DISCHARGE NOTE    Discharged Home via Wheelchair. Accompanied by Support staff  Belongings Taken by patient/family. AVS printed and discussed, IV removed, Rx for PO ABX needs to be picked up, Pt made aware. Pt ready for discharge home.

## 2021-05-22 NOTE — BRIEF OP NOTE
Pre-Operative Diagnosis: Ureteral calculus     Post-Operative Diagnosis: Ureteral calculus      Procedure Performed:   CYSTOSCOPY,  LEFT URETERAL STONE EXTRACTION WITH LASER LITHOTRIPSY, LEFT URETERAL STENT INSERTION    Surgeon(s) and Role:     * Ruben Abdalla

## 2021-05-22 NOTE — PLAN OF CARE
Pt. Admitted on 05-21-21 for flank pain and kidney stone, stent placed by Dr. Kimberly Lynn, POD 1. A&O x 4. Pain level is well controlled. Ambulates with standby assist. VS remain stable, IVF infusing. Voiding freely. Last BM on 05-20-21.   SCD's on, IS encourag

## 2021-05-28 PROBLEM — E21.0 PRIMARY HYPERPARATHYROIDISM (HCC): Status: ACTIVE | Noted: 2021-05-28

## 2021-09-10 PROBLEM — N20.0 CALCIUM NEPHROLITHIASIS: Status: ACTIVE | Noted: 2021-09-10

## 2021-09-10 PROBLEM — M81.8 OTHER OSTEOPOROSIS WITHOUT CURRENT PATHOLOGICAL FRACTURE: Status: ACTIVE | Noted: 2021-09-10

## 2021-11-04 RX ORDER — ACETAMINOPHEN 500 MG
1000 TABLET ORAL ONCE
Status: CANCELLED | OUTPATIENT
Start: 2021-11-04 | End: 2021-11-04

## 2021-11-13 ENCOUNTER — LAB ENCOUNTER (OUTPATIENT)
Dept: LAB | Age: 61
End: 2021-11-13
Attending: SURGERY
Payer: MEDICARE

## 2021-11-13 ENCOUNTER — LABORATORY ENCOUNTER (OUTPATIENT)
Dept: LAB | Age: 61
End: 2021-11-13
Attending: SURGERY
Payer: MEDICARE

## 2021-11-13 ENCOUNTER — EKG ENCOUNTER (OUTPATIENT)
Dept: LAB | Age: 61
End: 2021-11-13
Attending: SURGERY
Payer: MEDICARE

## 2021-11-13 DIAGNOSIS — E21.3 HYPERPARATHYROIDISM (HCC): ICD-10-CM

## 2021-11-13 PROCEDURE — 93010 ELECTROCARDIOGRAM REPORT: CPT | Performed by: INTERNAL MEDICINE

## 2021-11-13 PROCEDURE — 80048 BASIC METABOLIC PNL TOTAL CA: CPT

## 2021-11-13 PROCEDURE — 36415 COLL VENOUS BLD VENIPUNCTURE: CPT

## 2021-11-13 PROCEDURE — 93005 ELECTROCARDIOGRAM TRACING: CPT

## 2021-11-15 ENCOUNTER — ANESTHESIA EVENT (OUTPATIENT)
Dept: SURGERY | Facility: HOSPITAL | Age: 61
End: 2021-11-15
Payer: MEDICARE

## 2021-11-15 NOTE — ANESTHESIA PREPROCEDURE EVALUATION
PRE-OP EVALUATION    Patient Name: Cory Trejo    Admit Diagnosis: [facility] ([facility] [E21.3]    Pre-op Diagnosis: Hyperparathyroidism ([facility] [E21.3]    PARATHYROIDECTOMY WITH INTRAOPERATIVE ULTRASOUND, INTACT PARATHORMONE ASSAY, NERVE MONITORIN pressure Cyst of right ovary  Cervical radiculitis Foraminal stenosis of cervical region  Lumbar radiculitis DDD (degenerative disc disease), lumbar  Encounter for therapeutic drug monitoring Chronic left shoulder pain  Pain of left upper extremity Atheros 04/05/2021    Cystoscopy w/ Dr Shafer Records   • OTHER SURGICAL HISTORY  06/01/2021    Cysto Stent removal Dr. Diomedes Escalera    • PATIENT DOCUMENTED NOT TO HAVE EXPERIENCED ANY OF THE FOLLOWING EVENTS N/A 3/11/2016    Procedure: CERVICAL EPIDURAL;  Surgeon: Ramakrishna Mcclain labs reviewed.      Lab Results   Component Value Date     11/13/2021    K 4.6 11/13/2021     11/13/2021    CO2 28.0 11/13/2021    BUN 17 11/13/2021    BUN 19.0 08/26/2021    CREATSERUM 0.87 11/13/2021    CREATSERUM 0.92 (H) 08/26/2021    GLU 10

## 2021-11-16 ENCOUNTER — ANESTHESIA (OUTPATIENT)
Dept: SURGERY | Facility: HOSPITAL | Age: 61
End: 2021-11-16
Payer: MEDICARE

## 2021-11-16 ENCOUNTER — HOSPITAL ENCOUNTER (OUTPATIENT)
Facility: HOSPITAL | Age: 61
Setting detail: HOSPITAL OUTPATIENT SURGERY
Discharge: HOME | End: 2021-11-16
Attending: SURGERY | Admitting: SURGERY
Payer: MEDICARE

## 2021-11-16 VITALS
HEIGHT: 63 IN | TEMPERATURE: 98 F | SYSTOLIC BLOOD PRESSURE: 137 MMHG | WEIGHT: 227.06 LBS | RESPIRATION RATE: 16 BRPM | HEART RATE: 73 BPM | OXYGEN SATURATION: 100 % | BODY MASS INDEX: 40.23 KG/M2 | DIASTOLIC BLOOD PRESSURE: 89 MMHG

## 2021-11-16 DIAGNOSIS — E21.3 HYPERPARATHYROIDISM (HCC): Primary | ICD-10-CM

## 2021-11-16 PROCEDURE — 83970 ASSAY OF PARATHORMONE: CPT | Performed by: SURGERY

## 2021-11-16 PROCEDURE — 88305 TISSUE EXAM BY PATHOLOGIST: CPT | Performed by: SURGERY

## 2021-11-16 PROCEDURE — 0GBP0ZZ EXCISION OF LEFT INFERIOR PARATHYROID GLAND, OPEN APPROACH: ICD-10-PCS | Performed by: SURGERY

## 2021-11-16 PROCEDURE — 88331 PATH CONSLTJ SURG 1 BLK 1SPC: CPT | Performed by: SURGERY

## 2021-11-16 RX ORDER — BUPIVACAINE HYDROCHLORIDE 5 MG/ML
INJECTION, SOLUTION EPIDURAL; INTRACAUDAL AS NEEDED
Status: DISCONTINUED | OUTPATIENT
Start: 2021-11-16 | End: 2021-11-16 | Stop reason: HOSPADM

## 2021-11-16 RX ORDER — HYDROMORPHONE HYDROCHLORIDE 1 MG/ML
0.4 INJECTION, SOLUTION INTRAMUSCULAR; INTRAVENOUS; SUBCUTANEOUS EVERY 5 MIN PRN
Status: DISCONTINUED | OUTPATIENT
Start: 2021-11-16 | End: 2021-11-16

## 2021-11-16 RX ORDER — DEXAMETHASONE SODIUM PHOSPHATE 4 MG/ML
VIAL (ML) INJECTION AS NEEDED
Status: DISCONTINUED | OUTPATIENT
Start: 2021-11-16 | End: 2021-11-16 | Stop reason: SURG

## 2021-11-16 RX ORDER — LABETALOL HYDROCHLORIDE 5 MG/ML
5 INJECTION, SOLUTION INTRAVENOUS EVERY 5 MIN PRN
Status: DISCONTINUED | OUTPATIENT
Start: 2021-11-16 | End: 2021-11-16

## 2021-11-16 RX ORDER — IBUPROFEN 800 MG/1
800 TABLET ORAL EVERY 8 HOURS PRN
Qty: 20 TABLET | Refills: 1 | Status: SHIPPED | OUTPATIENT
Start: 2021-11-16 | End: 2022-01-15

## 2021-11-16 RX ORDER — ONDANSETRON 2 MG/ML
INJECTION INTRAMUSCULAR; INTRAVENOUS AS NEEDED
Status: DISCONTINUED | OUTPATIENT
Start: 2021-11-16 | End: 2021-11-16 | Stop reason: SURG

## 2021-11-16 RX ORDER — HYDROCODONE BITARTRATE AND ACETAMINOPHEN 5; 325 MG/1; MG/1
1 TABLET ORAL AS NEEDED
Status: COMPLETED | OUTPATIENT
Start: 2021-11-16 | End: 2021-11-16

## 2021-11-16 RX ORDER — ROCURONIUM BROMIDE 10 MG/ML
INJECTION, SOLUTION INTRAVENOUS AS NEEDED
Status: DISCONTINUED | OUTPATIENT
Start: 2021-11-16 | End: 2021-11-16 | Stop reason: SURG

## 2021-11-16 RX ORDER — ACETAMINOPHEN 500 MG
1000 TABLET ORAL ONCE AS NEEDED
Status: DISCONTINUED | OUTPATIENT
Start: 2021-11-16 | End: 2021-11-16

## 2021-11-16 RX ORDER — LABETALOL HYDROCHLORIDE 5 MG/ML
INJECTION, SOLUTION INTRAVENOUS
Status: COMPLETED
Start: 2021-11-16 | End: 2021-11-16

## 2021-11-16 RX ORDER — SCOLOPAMINE TRANSDERMAL SYSTEM 1 MG/1
1 PATCH, EXTENDED RELEASE TRANSDERMAL
Status: DISCONTINUED | OUTPATIENT
Start: 2021-11-16 | End: 2021-11-16

## 2021-11-16 RX ORDER — NALOXONE HYDROCHLORIDE 0.4 MG/ML
80 INJECTION, SOLUTION INTRAMUSCULAR; INTRAVENOUS; SUBCUTANEOUS AS NEEDED
Status: DISCONTINUED | OUTPATIENT
Start: 2021-11-16 | End: 2021-11-16

## 2021-11-16 RX ORDER — ACETAMINOPHEN 500 MG
1000 TABLET ORAL EVERY 6 HOURS PRN
COMMUNITY

## 2021-11-16 RX ORDER — MIDAZOLAM HYDROCHLORIDE 1 MG/ML
INJECTION INTRAMUSCULAR; INTRAVENOUS AS NEEDED
Status: DISCONTINUED | OUTPATIENT
Start: 2021-11-16 | End: 2021-11-16 | Stop reason: SURG

## 2021-11-16 RX ORDER — SODIUM CHLORIDE, SODIUM LACTATE, POTASSIUM CHLORIDE, CALCIUM CHLORIDE 600; 310; 30; 20 MG/100ML; MG/100ML; MG/100ML; MG/100ML
INJECTION, SOLUTION INTRAVENOUS CONTINUOUS
Status: DISCONTINUED | OUTPATIENT
Start: 2021-11-16 | End: 2021-11-16

## 2021-11-16 RX ORDER — ONDANSETRON 2 MG/ML
4 INJECTION INTRAMUSCULAR; INTRAVENOUS AS NEEDED
Status: DISCONTINUED | OUTPATIENT
Start: 2021-11-16 | End: 2021-11-16

## 2021-11-16 RX ORDER — HYDROCODONE BITARTRATE AND ACETAMINOPHEN 5; 325 MG/1; MG/1
1 TABLET ORAL EVERY 4 HOURS PRN
Qty: 10 TABLET | Refills: 0 | Status: SHIPPED | OUTPATIENT
Start: 2021-11-16

## 2021-11-16 RX ORDER — METOCLOPRAMIDE HYDROCHLORIDE 5 MG/ML
10 INJECTION INTRAMUSCULAR; INTRAVENOUS AS NEEDED
Status: DISCONTINUED | OUTPATIENT
Start: 2021-11-16 | End: 2021-11-16

## 2021-11-16 RX ORDER — LIDOCAINE HYDROCHLORIDE 10 MG/ML
INJECTION, SOLUTION EPIDURAL; INFILTRATION; INTRACAUDAL; PERINEURAL AS NEEDED
Status: DISCONTINUED | OUTPATIENT
Start: 2021-11-16 | End: 2021-11-16 | Stop reason: SURG

## 2021-11-16 RX ORDER — SCOLOPAMINE TRANSDERMAL SYSTEM 1 MG/1
PATCH, EXTENDED RELEASE TRANSDERMAL
Status: DISCONTINUED
Start: 2021-11-16 | End: 2021-11-16

## 2021-11-16 RX ORDER — MEPERIDINE HYDROCHLORIDE 25 MG/ML
12.5 INJECTION INTRAMUSCULAR; INTRAVENOUS; SUBCUTANEOUS AS NEEDED
Status: DISCONTINUED | OUTPATIENT
Start: 2021-11-16 | End: 2021-11-16

## 2021-11-16 RX ORDER — MIDAZOLAM HYDROCHLORIDE 1 MG/ML
1 INJECTION INTRAMUSCULAR; INTRAVENOUS EVERY 5 MIN PRN
Status: DISCONTINUED | OUTPATIENT
Start: 2021-11-16 | End: 2021-11-16

## 2021-11-16 RX ORDER — HYDROCODONE BITARTRATE AND ACETAMINOPHEN 5; 325 MG/1; MG/1
2 TABLET ORAL AS NEEDED
Status: COMPLETED | OUTPATIENT
Start: 2021-11-16 | End: 2021-11-16

## 2021-11-16 RX ADMIN — MIDAZOLAM HYDROCHLORIDE 2 MG: 1 INJECTION INTRAMUSCULAR; INTRAVENOUS at 11:08:00

## 2021-11-16 RX ADMIN — ONDANSETRON 4 MG: 2 INJECTION INTRAMUSCULAR; INTRAVENOUS at 12:31:00

## 2021-11-16 RX ADMIN — ROCURONIUM BROMIDE 50 MG: 10 INJECTION, SOLUTION INTRAVENOUS at 11:08:00

## 2021-11-16 RX ADMIN — DEXAMETHASONE SODIUM PHOSPHATE 8 MG: 4 MG/ML VIAL (ML) INJECTION at 11:08:00

## 2021-11-16 RX ADMIN — LIDOCAINE HYDROCHLORIDE 50 MG: 10 INJECTION, SOLUTION EPIDURAL; INFILTRATION; INTRACAUDAL; PERINEURAL at 11:08:00

## 2021-11-16 RX ADMIN — SODIUM CHLORIDE, SODIUM LACTATE, POTASSIUM CHLORIDE, CALCIUM CHLORIDE: 600; 310; 30; 20 INJECTION, SOLUTION INTRAVENOUS at 12:48:00

## 2021-11-16 NOTE — ANESTHESIA PROCEDURE NOTES
Airway  Date/Time: 11/16/2021 11:11 AM  Urgency: elective    Difficult airway    General Information and Staff    Patient location during procedure: OR  Anesthesiologist: Zach Kwok MD  Performed: anesthesiologist     Indications and Patient Conditi

## 2021-11-16 NOTE — BRIEF OP NOTE
Pre-Operative Diagnosis: Hyperparathyroidism ([facility] [E21.3]     Post-Operative Diagnosis: Hyperparathyroidism ([facility] [E21.3]      Procedure Performed:   PARATHYROIDECTOMY WITH INTRAOPERATIVE ULTRASOUND, INTACT PARATHORMONE ASSAY    Surgeon(s) and Role:     *

## 2021-11-16 NOTE — ANESTHESIA POSTPROCEDURE EVALUATION
[facility] Patient Status:  Hospital Outpatient Surgery   Age/Gender 64year old female MRN [id number]   Location [address] [facility] Attending Alex Dixon MD   Hosp Day # 0 PCP Daljit Dyer MD

## 2021-11-16 NOTE — ANESTHESIA PROCEDURE NOTES
Arterial Line  Performed by: Piedad Caballero MD  Authorized by: Piedad Caballero MD     General Information and Staff    Procedure Start:  11/16/2021 11:18 AM  Procedure End:  11/16/2021 11:22 AM  Anesthesiologist:  Piedad Caballero MD  Performed By:

## 2021-11-17 NOTE — OPERATIVE REPORT
[facility]    PATIENT'S NAME: Aicha Kaur   ATTENDING PHYSICIAN: Monica Gloria M.D. OPERATING PHYSICIAN: Monica Gloria M.D.    PATIENT ACCOUNT#:   [de-identified]    LOCATION:  PACU [facility] PACU 5 EDWP 10  MEDICAL RECORD #:   [id number]       DATE OF BIRTH: OPERATIVE TECHNIQUE:  Patient was brought to the operating room, was placed in supine position on the operating table. Lower extremity SCD boots were placed.   After IV sedation and endotracheal intubation, the patient's arms tucked at side and a roll

## 2021-11-20 ENCOUNTER — TELEPHONE (OUTPATIENT)
Dept: SURGERY | Facility: HOSPITAL | Age: 61
End: 2021-11-20

## 2021-11-20 NOTE — TELEPHONE ENCOUNTER
The patient called this afternoon. She has noted swelling in her incision, half the size of a golf ball. There is no redness or drainage. She started the antibiotics today. The rash is improving. She is using Benadryl. No fever or chills.   No signifi

## 2021-12-16 ENCOUNTER — OFFICE VISIT (OUTPATIENT)
Dept: FAMILY MEDICINE CLINIC | Facility: CLINIC | Age: 61
End: 2021-12-16
Payer: MEDICARE

## 2021-12-16 VITALS
HEART RATE: 76 BPM | BODY MASS INDEX: 38.98 KG/M2 | HEIGHT: 63 IN | OXYGEN SATURATION: 99 % | DIASTOLIC BLOOD PRESSURE: 68 MMHG | WEIGHT: 220 LBS | TEMPERATURE: 98 F | RESPIRATION RATE: 18 BRPM | SYSTOLIC BLOOD PRESSURE: 134 MMHG

## 2021-12-16 DIAGNOSIS — J06.9 VIRAL URI WITH COUGH: Primary | ICD-10-CM

## 2021-12-16 PROCEDURE — 99213 OFFICE O/P EST LOW 20 MIN: CPT | Performed by: NURSE PRACTITIONER

## 2021-12-16 PROCEDURE — U0002 COVID-19 LAB TEST NON-CDC: HCPCS | Performed by: NURSE PRACTITIONER

## 2021-12-16 RX ORDER — ALBUTEROL SULFATE 90 UG/1
2 AEROSOL, METERED RESPIRATORY (INHALATION) EVERY 4 HOURS PRN
Qty: 1 EACH | Refills: 0 | Status: SHIPPED | OUTPATIENT
Start: 2021-12-16 | End: 2021-12-30

## 2021-12-16 NOTE — PATIENT INSTRUCTIONS
1. Rest. Drink plenty of fluids. 2. Supportive care as discussed. 3. Covid-19 test negative.    4. Follow up with PMD in 2-3 days for re-eval. Go to the emergency department immediately if symptoms worsen, change, you fevers, develop chest discomfort, w lungs.  · Over-the-counter cold medicines will not shorten the length of time you’re sick, but they may be helpful for the following symptoms: cough, sore throat, and nasal and sinus congestion.  If you take prescription medicines, ask your healthcare provi

## 2021-12-16 NOTE — PROGRESS NOTES
CHIEF COMPLAINT:   Patient presents with:  Covid-19 Test: nasal congestion, runny nose      HPI:   Avinash Dennis is a 64year old female who presents for covid-19 testing.  Patient reports nasal cognestion, runny nose and occasional nonproductive cough t • Hematuria    • High blood pressure    • Hx of diseases NEC      h/o parvovirus b 19   • Hx of diseases NEC     optical herpes   • HYPERTENSION    • Migraine, unspecified, without mention of intractable migraine without mention of status migrainosus 06/20/14    Cystoscopy - Dr. Jameson Overton    • OTHER SURGICAL HISTORY  04/05/2021    Cystoscopy w/ Dr Bhupinder Guerrier   • OTHER SURGICAL HISTORY  06/01/2021    Cysto Stent removal Dr. Adriana Raza    • PATIENT DOCUMENTED NOT TO HAVE EXPERIENCED ANY OF THE FOLLOWING EVENTS N Used    Vaping Use      Vaping Use: Never used    Alcohol use: Not Currently    Drug use: No        REVIEW OF SYSTEMS:   GENERAL: Denies fevers.  Notes good appetite  SKIN: no rashes or abnormal skin lesions  HEENT: Denies sore throat, + sinus symptoms, Sumanth Delroy reviewed symptomatic care at home. I inquired if pt still has albuterol inhaler or if she needs a refill. Pt notes she misplaced her albuterol inhaler awhile back. Will send new script to pharmacy. The risks, benefits and potential side effects of possible liver or kidney disease, have ever had a stomach ulcer or gastrointestinal bleeding, or are taking blood-thinning medicines, talk with your healthcare provider before using these medicines.  Aspirin should never be given to anyone under 25years of age who

## 2021-12-20 ENCOUNTER — TELEPHONE (OUTPATIENT)
Dept: OPHTHALMOLOGY | Age: 61
End: 2021-12-20

## 2021-12-21 ENCOUNTER — OFFICE VISIT (OUTPATIENT)
Dept: OPHTHALMOLOGY | Age: 61
End: 2021-12-21

## 2021-12-21 DIAGNOSIS — H01.9 DERMATITIS OF EYELIDS OF BOTH EYES, UNSPECIFIED TYPE: Primary | ICD-10-CM

## 2021-12-21 PROCEDURE — 99203 OFFICE O/P NEW LOW 30 MIN: CPT | Performed by: OPHTHALMOLOGY

## 2021-12-21 RX ORDER — ALBUTEROL SULFATE 90 UG/1
2 AEROSOL, METERED RESPIRATORY (INHALATION)
COMMUNITY
Start: 2021-12-16

## 2021-12-21 RX ORDER — VALACYCLOVIR HYDROCHLORIDE 1 G/1
TABLET, FILM COATED ORAL
COMMUNITY
Start: 2021-12-19 | End: 2021-12-21 | Stop reason: SDUPTHER

## 2021-12-21 RX ORDER — ATENOLOL 50 MG/1
TABLET ORAL
COMMUNITY
Start: 2021-12-05

## 2021-12-21 RX ORDER — AZITHROMYCIN 250 MG/1
TABLET, FILM COATED ORAL
COMMUNITY
Start: 2021-12-19

## 2021-12-21 RX ORDER — VALACYCLOVIR HYDROCHLORIDE 1 G/1
1000 TABLET, FILM COATED ORAL 3 TIMES DAILY
Qty: 21 TABLET | Refills: 3 | Status: SHIPPED | OUTPATIENT
Start: 2021-12-21 | End: 2021-12-31

## 2022-01-08 ENCOUNTER — APPOINTMENT (OUTPATIENT)
Dept: CT IMAGING | Facility: HOSPITAL | Age: 62
DRG: 872 | End: 2022-01-08
Attending: EMERGENCY MEDICINE
Payer: MEDICARE

## 2022-01-08 ENCOUNTER — HOSPITAL ENCOUNTER (EMERGENCY)
Facility: HOSPITAL | Age: 62
Discharge: HOME | DRG: 872 | End: 2022-01-08
Attending: EMERGENCY MEDICINE
Payer: MEDICARE

## 2022-01-08 VITALS
HEIGHT: 63 IN | SYSTOLIC BLOOD PRESSURE: 133 MMHG | BODY MASS INDEX: 38.98 KG/M2 | HEART RATE: 71 BPM | OXYGEN SATURATION: 97 % | WEIGHT: 220 LBS | DIASTOLIC BLOOD PRESSURE: 99 MMHG | TEMPERATURE: 98 F | RESPIRATION RATE: 18 BRPM

## 2022-01-08 DIAGNOSIS — N12 PYELONEPHRITIS: Primary | ICD-10-CM

## 2022-01-08 LAB
ALBUMIN SERPL-MCNC: 2.5 G/DL (ref 3.4–5)
ALBUMIN/GLOB SERPL: 0.5 {RATIO} (ref 1–2)
ALP LIVER SERPL-CCNC: 159 U/L
ALT SERPL-CCNC: 24 U/L
ANION GAP SERPL CALC-SCNC: 4 MMOL/L (ref 0–18)
AST SERPL-CCNC: 23 U/L (ref 15–37)
BASOPHILS # BLD AUTO: 0.02 X10(3) UL (ref 0–0.2)
BASOPHILS NFR BLD AUTO: 0.2 %
BILIRUB SERPL-MCNC: 0.5 MG/DL (ref 0.1–2)
BILIRUB UR QL STRIP.AUTO: NEGATIVE
BUN BLD-MCNC: 17 MG/DL (ref 7–18)
CALCIUM BLD-MCNC: 8.6 MG/DL (ref 8.5–10.1)
CHLORIDE SERPL-SCNC: 108 MMOL/L (ref 98–112)
CO2 SERPL-SCNC: 26 MMOL/L (ref 21–32)
COLOR UR AUTO: YELLOW
CREAT BLD-MCNC: 1.16 MG/DL
EOSINOPHIL # BLD AUTO: 0.14 X10(3) UL (ref 0–0.7)
EOSINOPHIL NFR BLD AUTO: 1.6 %
ERYTHROCYTE [DISTWIDTH] IN BLOOD BY AUTOMATED COUNT: 13.7 %
GLOBULIN PLAS-MCNC: 4.6 G/DL (ref 2.8–4.4)
GLUCOSE BLD-MCNC: 93 MG/DL (ref 70–99)
GLUCOSE UR STRIP.AUTO-MCNC: NEGATIVE MG/DL
HCT VFR BLD AUTO: 39.4 %
HGB BLD-MCNC: 12.6 G/DL
HYALINE CASTS #/AREA URNS AUTO: PRESENT /LPF
IMM GRANULOCYTES # BLD AUTO: 0.09 X10(3) UL (ref 0–1)
IMM GRANULOCYTES NFR BLD: 1 %
KETONES UR STRIP.AUTO-MCNC: NEGATIVE MG/DL
LIPASE SERPL-CCNC: 71 U/L (ref 73–393)
LYMPHOCYTES # BLD AUTO: 1.35 X10(3) UL (ref 1–4)
LYMPHOCYTES NFR BLD AUTO: 15 %
MCH RBC QN AUTO: 29 PG (ref 26–34)
MCHC RBC AUTO-ENTMCNC: 32 G/DL (ref 31–37)
MCV RBC AUTO: 90.8 FL
MONOCYTES # BLD AUTO: 1.39 X10(3) UL (ref 0.1–1)
MONOCYTES NFR BLD AUTO: 15.5 %
NEUTROPHILS # BLD AUTO: 6 X10 (3) UL (ref 1.5–7.7)
NEUTROPHILS # BLD AUTO: 6 X10(3) UL (ref 1.5–7.7)
NEUTROPHILS NFR BLD AUTO: 66.7 %
NITRITE UR QL STRIP.AUTO: NEGATIVE
OSMOLALITY SERPL CALC.SUM OF ELEC: 287 MOSM/KG (ref 275–295)
PH UR STRIP.AUTO: 5 [PH] (ref 5–8)
PLATELET # BLD AUTO: 124 10(3)UL (ref 150–450)
POTASSIUM SERPL-SCNC: 3.3 MMOL/L (ref 3.5–5.1)
PROT SERPL-MCNC: 7.1 G/DL (ref 6.4–8.2)
PROT UR STRIP.AUTO-MCNC: 30 MG/DL
RBC # BLD AUTO: 4.34 X10(6)UL
RBC #/AREA URNS AUTO: >10 /HPF
SARS-COV-2 RNA RESP QL NAA+PROBE: NOT DETECTED
SODIUM SERPL-SCNC: 138 MMOL/L (ref 136–145)
SP GR UR STRIP.AUTO: 1.02 (ref 1–1.03)
UROBILINOGEN UR STRIP.AUTO-MCNC: 4 MG/DL
WBC # BLD AUTO: 9 X10(3) UL (ref 4–11)

## 2022-01-08 PROCEDURE — 87150 DNA/RNA AMPLIFIED PROBE: CPT | Performed by: EMERGENCY MEDICINE

## 2022-01-08 PROCEDURE — 87086 URINE CULTURE/COLONY COUNT: CPT | Performed by: EMERGENCY MEDICINE

## 2022-01-08 PROCEDURE — 99284 EMERGENCY DEPT VISIT MOD MDM: CPT

## 2022-01-08 PROCEDURE — 83690 ASSAY OF LIPASE: CPT | Performed by: EMERGENCY MEDICINE

## 2022-01-08 PROCEDURE — 80053 COMPREHEN METABOLIC PANEL: CPT | Performed by: EMERGENCY MEDICINE

## 2022-01-08 PROCEDURE — 85025 COMPLETE CBC W/AUTO DIFF WBC: CPT | Performed by: EMERGENCY MEDICINE

## 2022-01-08 PROCEDURE — 87040 BLOOD CULTURE FOR BACTERIA: CPT | Performed by: EMERGENCY MEDICINE

## 2022-01-08 PROCEDURE — 87186 SC STD MICRODIL/AGAR DIL: CPT | Performed by: EMERGENCY MEDICINE

## 2022-01-08 PROCEDURE — 81001 URINALYSIS AUTO W/SCOPE: CPT | Performed by: EMERGENCY MEDICINE

## 2022-01-08 PROCEDURE — 87077 CULTURE AEROBIC IDENTIFY: CPT | Performed by: EMERGENCY MEDICINE

## 2022-01-08 PROCEDURE — 74176 CT ABD & PELVIS W/O CONTRAST: CPT | Performed by: EMERGENCY MEDICINE

## 2022-01-08 PROCEDURE — 36415 COLL VENOUS BLD VENIPUNCTURE: CPT

## 2022-01-08 PROCEDURE — 96365 THER/PROPH/DIAG IV INF INIT: CPT

## 2022-01-08 PROCEDURE — 96375 TX/PRO/DX INJ NEW DRUG ADDON: CPT

## 2022-01-08 RX ORDER — LEVOFLOXACIN 500 MG/1
500 TABLET, FILM COATED ORAL ONCE
Status: COMPLETED | OUTPATIENT
Start: 2022-01-08 | End: 2022-01-08

## 2022-01-08 RX ORDER — ONDANSETRON 2 MG/ML
4 INJECTION INTRAMUSCULAR; INTRAVENOUS ONCE
Status: COMPLETED | OUTPATIENT
Start: 2022-01-08 | End: 2022-01-08

## 2022-01-08 RX ORDER — LEVOFLOXACIN 500 MG/1
500 TABLET, FILM COATED ORAL DAILY
Qty: 6 TABLET | Refills: 0 | Status: SHIPPED | OUTPATIENT
Start: 2022-01-08 | End: 2022-01-12

## 2022-01-08 NOTE — ED PROVIDER NOTES
Patient Seen in: [facility] Emergency Department      History   Patient presents with:  Fatigue    Stated Complaint: fatigue, back pain     Subjective:   HPI    Patient is a 70-year-old female comes emergency room for evaluation of multiple complain COLONOSCOPY,DIAGNOSTIC  4/3/08    Performed by Juan Glasgow at [facility]   • D & C  10/5/15    hysteroscopy D&C   • INJECTION, W/WO CONTRAST, DX/THERAPEUTIC SUBSTANCE, EPIDURAL/SUBARACHNOID; CERVICAL/THORACIC N/A 3/11/2016    Procedure: CE Location: [facility]   • PATIENT [place] PREOPERATIVE ORDER FOR IV ANTIBIOTIC SURGICAL SITE INFECTION PROPHYLAXIS.  N/A 3/11/2016    Procedure: CERVICAL EPIDURAL;  Surgeon: Eloy Beck MD;  Location: [address] [place] [address] well appearing and non-toxic, Alert and oriented X 3   HEENT: Normocephalic, atraumatic. Moist mucous membranes. Pupils equal round reactive to light and accommodation, extraocular motion is intact, sclerae white, conjunctiva is pink.   Oropharynx is unre .0 (*)     Monocyte Absolute 1.39 (*)     All other components within normal limits   RAPID SARS-COV-2 BY PCR - Normal   CBC WITH DIFFERENTIAL WITH PLATELET    Narrative:      The following orders were created for panel order CBC With Differential Wi changes from gastric bypass. PELVIS:  Bladder is unremarkable in appearance. Calcified phleboliths within the pelvis. No free pelvic fluid. AORTA/VASCULAR:  Aorta is normal in caliber. Mild atheromatous calcifications. BONES:  No acute fractures.   Mil to return immediately to the ER for worsening, concerning, new, changing or persisting symptoms. I discussed the case with the patient and they had no questions, complaints, or concerns. Patient felt comfortable going home.

## 2022-01-08 NOTE — ED INITIAL ASSESSMENT (HPI)
Pt arrives with complaints of abdominal and back pain for \"probably 7 days\". Pt states that she was diagnosed with E.coli yesterday, and has been having hematuria. Pt experiencing emesis since yesterday as well as fatigue.

## 2022-01-09 ENCOUNTER — HOSPITAL ENCOUNTER (INPATIENT)
Facility: HOSPITAL | Age: 62
LOS: 2 days | Discharge: HOME | DRG: 872 | End: 2022-01-12
Attending: EMERGENCY MEDICINE | Admitting: INTERNAL MEDICINE
Payer: MEDICARE

## 2022-01-09 DIAGNOSIS — N12 PYELONEPHRITIS: Primary | ICD-10-CM

## 2022-01-09 DIAGNOSIS — R78.81 BACTEREMIA: ICD-10-CM

## 2022-01-09 LAB
ALBUMIN SERPL-MCNC: 2.3 G/DL (ref 3.4–5)
ALBUMIN/GLOB SERPL: 0.5 {RATIO} (ref 1–2)
ALP LIVER SERPL-CCNC: 122 U/L
ALT SERPL-CCNC: 31 U/L
ANION GAP SERPL CALC-SCNC: 4 MMOL/L (ref 0–18)
AST SERPL-CCNC: 30 U/L (ref 15–37)
BASOPHILS # BLD AUTO: 0.03 X10(3) UL (ref 0–0.2)
BASOPHILS NFR BLD AUTO: 0.6 %
BILIRUB SERPL-MCNC: 0.3 MG/DL (ref 0.1–2)
BILIRUB UR QL STRIP.AUTO: NEGATIVE
BUN BLD-MCNC: 13 MG/DL (ref 7–18)
CALCIUM BLD-MCNC: 8.8 MG/DL (ref 8.5–10.1)
CHLORIDE SERPL-SCNC: 111 MMOL/L (ref 98–112)
CO2 SERPL-SCNC: 27 MMOL/L (ref 21–32)
COLOR UR AUTO: YELLOW
CREAT BLD-MCNC: 1.13 MG/DL
EOSINOPHIL # BLD AUTO: 0.1 X10(3) UL (ref 0–0.7)
EOSINOPHIL NFR BLD AUTO: 2 %
ERYTHROCYTE [DISTWIDTH] IN BLOOD BY AUTOMATED COUNT: 13.6 %
GLOBULIN PLAS-MCNC: 4.5 G/DL (ref 2.8–4.4)
GLUCOSE BLD-MCNC: 95 MG/DL (ref 70–99)
GLUCOSE UR STRIP.AUTO-MCNC: NEGATIVE MG/DL
HCT VFR BLD AUTO: 34.3 %
HGB BLD-MCNC: 11.4 G/DL
IMM GRANULOCYTES # BLD AUTO: 0.12 X10(3) UL (ref 0–1)
IMM GRANULOCYTES NFR BLD: 2.4 %
KETONES UR STRIP.AUTO-MCNC: NEGATIVE MG/DL
LYMPHOCYTES # BLD AUTO: 1.9 X10(3) UL (ref 1–4)
LYMPHOCYTES NFR BLD AUTO: 38.5 %
MCH RBC QN AUTO: 29.4 PG (ref 26–34)
MCHC RBC AUTO-ENTMCNC: 33.2 G/DL (ref 31–37)
MCV RBC AUTO: 88.4 FL
MONOCYTES # BLD AUTO: 0.97 X10(3) UL (ref 0.1–1)
MONOCYTES NFR BLD AUTO: 19.7 %
NEUTROPHILS # BLD AUTO: 1.81 X10 (3) UL (ref 1.5–7.7)
NEUTROPHILS # BLD AUTO: 1.81 X10(3) UL (ref 1.5–7.7)
NEUTROPHILS NFR BLD AUTO: 36.8 %
NITRITE UR QL STRIP.AUTO: NEGATIVE
OSMOLALITY SERPL CALC.SUM OF ELEC: 294 MOSM/KG (ref 275–295)
PH UR STRIP.AUTO: 5 [PH] (ref 5–8)
PLATELET # BLD AUTO: 138 10(3)UL (ref 150–450)
POTASSIUM SERPL-SCNC: 3.9 MMOL/L (ref 3.5–5.1)
PROT SERPL-MCNC: 6.8 G/DL (ref 6.4–8.2)
PROT UR STRIP.AUTO-MCNC: 30 MG/DL
RBC # BLD AUTO: 3.88 X10(6)UL
RBC #/AREA URNS AUTO: >10 /HPF
SARS-COV-2 RNA RESP QL NAA+PROBE: NOT DETECTED
SODIUM SERPL-SCNC: 142 MMOL/L (ref 136–145)
SP GR UR STRIP.AUTO: 1.01 (ref 1–1.03)
UROBILINOGEN UR STRIP.AUTO-MCNC: <2 MG/DL
WBC # BLD AUTO: 4.9 X10(3) UL (ref 4–11)

## 2022-01-09 PROCEDURE — 36415 COLL VENOUS BLD VENIPUNCTURE: CPT

## 2022-01-09 PROCEDURE — 80053 COMPREHEN METABOLIC PANEL: CPT | Performed by: EMERGENCY MEDICINE

## 2022-01-09 PROCEDURE — 99285 EMERGENCY DEPT VISIT HI MDM: CPT

## 2022-01-09 PROCEDURE — 87040 BLOOD CULTURE FOR BACTERIA: CPT | Performed by: EMERGENCY MEDICINE

## 2022-01-09 PROCEDURE — 96365 THER/PROPH/DIAG IV INF INIT: CPT

## 2022-01-09 PROCEDURE — 85025 COMPLETE CBC W/AUTO DIFF WBC: CPT | Performed by: EMERGENCY MEDICINE

## 2022-01-09 PROCEDURE — 81001 URINALYSIS AUTO W/SCOPE: CPT | Performed by: EMERGENCY MEDICINE

## 2022-01-09 RX ORDER — CIPROFLOXACIN 2 MG/ML
400 INJECTION, SOLUTION INTRAVENOUS EVERY 12 HOURS SCHEDULED
Status: DISCONTINUED | OUTPATIENT
Start: 2022-01-09 | End: 2022-01-11

## 2022-01-09 RX ORDER — LEVOFLOXACIN 5 MG/ML
750 INJECTION, SOLUTION INTRAVENOUS ONCE
Status: DISCONTINUED | OUTPATIENT
Start: 2022-01-09 | End: 2022-01-09

## 2022-01-09 RX ORDER — SODIUM CHLORIDE 9 MG/ML
INJECTION, SOLUTION INTRAVENOUS CONTINUOUS
Status: DISCONTINUED | OUTPATIENT
Start: 2022-01-09 | End: 2022-01-10 | Stop reason: ALTCHOICE

## 2022-01-10 ENCOUNTER — APPOINTMENT (OUTPATIENT)
Dept: GENERAL RADIOLOGY | Facility: HOSPITAL | Age: 62
DRG: 872 | End: 2022-01-10
Attending: INTERNAL MEDICINE
Payer: MEDICARE

## 2022-01-10 PROBLEM — R78.81 BACTEREMIA: Status: ACTIVE | Noted: 2022-01-10

## 2022-01-10 LAB
ANION GAP SERPL CALC-SCNC: 6 MMOL/L (ref 0–18)
BASOPHILS # BLD AUTO: 0.02 X10(3) UL (ref 0–0.2)
BASOPHILS NFR BLD AUTO: 0.4 %
BUN BLD-MCNC: 13 MG/DL (ref 7–18)
CALCIUM BLD-MCNC: 8.3 MG/DL (ref 8.5–10.1)
CHLORIDE SERPL-SCNC: 112 MMOL/L (ref 98–112)
CO2 SERPL-SCNC: 25 MMOL/L (ref 21–32)
CREAT BLD-MCNC: 1.05 MG/DL
EOSINOPHIL # BLD AUTO: 0.1 X10(3) UL (ref 0–0.7)
EOSINOPHIL NFR BLD AUTO: 2 %
ERYTHROCYTE [DISTWIDTH] IN BLOOD BY AUTOMATED COUNT: 13.7 %
GLUCOSE BLD-MCNC: 125 MG/DL (ref 70–99)
HCT VFR BLD AUTO: 31.8 %
HGB BLD-MCNC: 10.2 G/DL
IMM GRANULOCYTES # BLD AUTO: 0.08 X10(3) UL (ref 0–1)
IMM GRANULOCYTES NFR BLD: 1.6 %
LYMPHOCYTES # BLD AUTO: 1.57 X10(3) UL (ref 1–4)
LYMPHOCYTES NFR BLD AUTO: 30.7 %
MCH RBC QN AUTO: 29 PG (ref 26–34)
MCHC RBC AUTO-ENTMCNC: 32.1 G/DL (ref 31–37)
MCV RBC AUTO: 90.3 FL
MONOCYTES # BLD AUTO: 0.77 X10(3) UL (ref 0.1–1)
MONOCYTES NFR BLD AUTO: 15.1 %
NEUTROPHILS # BLD AUTO: 2.57 X10 (3) UL (ref 1.5–7.7)
NEUTROPHILS # BLD AUTO: 2.57 X10(3) UL (ref 1.5–7.7)
NEUTROPHILS NFR BLD AUTO: 50.2 %
OSMOLALITY SERPL CALC.SUM OF ELEC: 298 MOSM/KG (ref 275–295)
PLATELET # BLD AUTO: 168 10(3)UL (ref 150–450)
POTASSIUM SERPL-SCNC: 4.1 MMOL/L (ref 3.5–5.1)
RBC # BLD AUTO: 3.52 X10(6)UL
SODIUM SERPL-SCNC: 143 MMOL/L (ref 136–145)
WBC # BLD AUTO: 5.1 X10(3) UL (ref 4–11)

## 2022-01-10 PROCEDURE — 80048 BASIC METABOLIC PNL TOTAL CA: CPT | Performed by: INTERNAL MEDICINE

## 2022-01-10 PROCEDURE — 71046 X-RAY EXAM CHEST 2 VIEWS: CPT | Performed by: INTERNAL MEDICINE

## 2022-01-10 PROCEDURE — 85025 COMPLETE CBC W/AUTO DIFF WBC: CPT | Performed by: INTERNAL MEDICINE

## 2022-01-10 RX ORDER — ENOXAPARIN SODIUM 100 MG/ML
40 INJECTION SUBCUTANEOUS DAILY
Status: DISCONTINUED | OUTPATIENT
Start: 2022-01-10 | End: 2022-01-12

## 2022-01-10 RX ORDER — ATENOLOL 50 MG/1
50 TABLET ORAL
Status: DISCONTINUED | OUTPATIENT
Start: 2022-01-10 | End: 2022-01-12

## 2022-01-10 RX ORDER — ONDANSETRON 2 MG/ML
4 INJECTION INTRAMUSCULAR; INTRAVENOUS EVERY 6 HOURS PRN
Status: DISCONTINUED | OUTPATIENT
Start: 2022-01-10 | End: 2022-01-12

## 2022-01-10 RX ORDER — ALBUTEROL SULFATE 90 UG/1
2 AEROSOL, METERED RESPIRATORY (INHALATION) EVERY 4 HOURS PRN
Status: DISCONTINUED | OUTPATIENT
Start: 2022-01-10 | End: 2022-01-12

## 2022-01-10 RX ORDER — SODIUM CHLORIDE 9 MG/ML
INJECTION, SOLUTION INTRAVENOUS CONTINUOUS
Status: ACTIVE | OUTPATIENT
Start: 2022-01-10 | End: 2022-01-10

## 2022-01-10 RX ORDER — CYCLOBENZAPRINE HCL 10 MG
10 TABLET ORAL NIGHTLY PRN
Status: DISCONTINUED | OUTPATIENT
Start: 2022-01-10 | End: 2022-01-12

## 2022-01-10 RX ORDER — HYDROCODONE BITARTRATE AND ACETAMINOPHEN 5; 325 MG/1; MG/1
1 TABLET ORAL EVERY 4 HOURS PRN
Status: DISCONTINUED | OUTPATIENT
Start: 2022-01-10 | End: 2022-01-12

## 2022-01-10 RX ORDER — ACETAMINOPHEN 325 MG/1
650 TABLET ORAL EVERY 6 HOURS PRN
Status: DISCONTINUED | OUTPATIENT
Start: 2022-01-10 | End: 2022-01-12

## 2022-01-10 RX ORDER — SODIUM CHLORIDE 9 MG/ML
INJECTION, SOLUTION INTRAVENOUS CONTINUOUS
Status: DISCONTINUED | OUTPATIENT
Start: 2022-01-10 | End: 2022-01-11

## 2022-01-10 RX ORDER — IBUPROFEN 600 MG/1
600 TABLET ORAL EVERY 6 HOURS PRN
Status: DISCONTINUED | OUTPATIENT
Start: 2022-01-10 | End: 2022-01-12

## 2022-01-10 RX ORDER — GARLIC EXTRACT 500 MG
1 CAPSULE ORAL DAILY
Status: DISCONTINUED | OUTPATIENT
Start: 2022-01-10 | End: 2022-01-12

## 2022-01-10 NOTE — H&P
[facility] Hospitalist H&P       CC: Patient presents with:  Abnormal Labs       PCP: Jam Nevarez MD    History of Present Illness:  Ms. Maria Castro is a 65 yo female with PMH of anxiety, depression, asthma, HTN, migraines, obesity who presented to the • COLONOSCOPY  2008   • COLONOSCOPY,DIAGNOSTIC  4/3/08    Performed by Marlo Jama at [address] [place] [address]   • D & C  10/5/15    hysteroscopy D&C   • INJECTION, W/WO CONTRAST, DX/THERAPEUTIC SUBSTANCE, EPIDURAL/SUBARACHNOID; CERVICAL/THORACIC N/A Shelby Gardner MD;  Location: [facility] FOR [facility]IN [facility]NA[facility]   • PATIENT [address] FOR IV ANTIBIOTIC SURGICAL SITE INFECTION PROPHYLAXIS.  N/A 3/11/2016    Procedure: CERVICAL EPIDURAL;  Surgeon: Shelby Gardner MD;  Location: [address] [place] [address] (six) hours as needed for Pain., Disp: , Rfl:   ibuprofen 800 MG Oral Tab, Take 1 tablet (800 mg total) by mouth every 8 (eight) hours as needed.  For 2 weeks, Disp: 20 tablet, Rfl: 1  ATENOLOL 50 MG Oral Tab, Take 1 tablet by mouth once daily, Disp: 90 tab (99.8 kg)  11/16/21 : 227 lb 1.2 oz (103 kg)  09/15/21 : 220 lb (99.8 kg)  09/10/21 : 223 lb (101.2 kg)    Gen: No acute distress, alert and oriented   HEENT: sclera anicteric, oral mucosa moist  Pulm: Lungs clear bilaterally, good inspiratory effort   CV: Normal.  No mass or enlargement. KIDNEYS:  Minimal bilateral hydronephrosis. No obstructing urolithiasis noted. BOWEL/MESENTERY:  Bowel is normal in caliber. No evidence of obstruction. Postoperative changes from gastric bypass.  PELVIS:  Bladder is unre Hospitalization - Initial Certification    Patient will require inpatient services that will reasonably be expected to span two midnight's based on the clinical documentation in H+P.    Based on patients current state of illness, I anticipate that, after [facility]

## 2022-01-10 NOTE — CONSULTS
INFECTIOUS DISEASE CONSULT NOTE    Christine Venegas Patient Status:  Inpatient    1/15/1960 MRN [id number]   [facility] 3NE-A Attending Donny Chao MD   Hosp Day # 0 PCP Silva Will COLONOSCOPY,DIAGNOSTIC  4/3/08    Performed by Li[facility]   • D & C  10/5/15    hysteroscopy D&C   • INJECTION, W/WO CONTRAST, DX/THERAPEUTIC SUBSTANCE, EPIDURAL/SUBARACHNOID; CERVICAL/THORACIC N/A 3/11/2016    Procedure: CE Location: [facility]   • PATIENT [place] PREOPERATIVE ORDER FOR IV ANTIBIOTIC SURGICAL SITE INFECTION PROPHYLAXIS.  N/A 3/11/2016    Procedure: CERVICAL EPIDURAL;  Surgeon: Joseph Carver MD;  Location: [address] [place] [address] ITCHING    Medications:    Current Facility-Administered Medications:   •  HYDROcodone-acetaminophen (NORCO) 5-325 MG per tab 1 tablet, 1 tablet, Oral, Q4H PRN  •  0.9% NaCl infusion, , Intravenous, Continuous  •  enoxaparin (LOVENOX) 40 MG/0.4ML injection kg), last menstrual period 06/15/2013, SpO2 98 %, not currently breastfeeding. HEENT: no scleral icterus or conjunctival injection. Moist mucous membranes. No thrush  Neck: No lymphadenopathy. Supple. Respiratory: Clear to auscultation bilaterally.   No potential stones in the cranio-caudal plane. Dose reduction techniques were used. Dose information is transmitted to the [facility] [employer] [facility] Rad[facility]) NRDR ([address]) which includes the Dose Index Registry.   PATIENT STATED HI for allowing me to participate in the care of this patient. Please do not hesitate to call if you have any questions. I will continue to follow with you and will make further recommendations based on her progress.     Ted Quiroz MD  1/10/2022  9:36 AM

## 2022-01-10 NOTE — ED QUICK NOTES
Orders for admission, patient is aware of plan and ready to go upstairs. Any questions, please call ED RN  at extension [phone].      Patient Covid vaccination status: Fully vaccinated     COVID Test Ordered in ED: Rapid SARS-CoV-2 by PCR    COVID Suspicion a

## 2022-01-10 NOTE — PLAN OF CARE
64year old female alert and oriented x 4. Patient reported feeling sick for the past month \" Start with pneumonia which turned into bronchitis, and asthma, then UTI to kidney infection, now blood infection. \" Patient reported having a fever and shaking a

## 2022-01-10 NOTE — PROGRESS NOTES
AxO x4. Room air, lungs diminished, no SOB or cough. On tele, NSR. Lovenox. Right flank pain with urinary urgency, IV abx, Afebrile. C/o headache, managed with PO tylenol. IVF. Up self. Appetite okay. Negative PVR today. WCTM.

## 2022-01-10 NOTE — ED PROVIDER NOTES
Patient Seen in: [facility] Emergency Department      History   Patient presents with:  Abnormal Labs    Stated Complaint: Received call for positive blood cultures, reports fever    Subjective:   HPI    Patient is a 19-year-old female with history Pulmonary:      Effort: Pulmonary effort is normal.      Breath sounds: Normal breath sounds. Abdominal:      General: Bowel sounds are normal.      Palpations: Abdomen is soft. Musculoskeletal:         General: Normal range of motion.       Cervical Calcified phleboliths within the pelvis. No free pelvic fluid. AORTA/VASCULAR:  Aorta is normal in caliber. Mild atheromatous calcifications. BONES:  No acute fractures. Mild degenerative changes.             CONCLUSION:  Minimal bilateral hydronephros

## 2022-01-10 NOTE — ED INITIAL ASSESSMENT (HPI)
Pt states she was seen here yesterday for weakness, fatigue, generelized body aches, told to come back for positive blood cultures. States she is not feeling better.  Pt denies TREVON, denies CP, reports on and off fever

## 2022-01-10 NOTE — ED QUICK NOTES
Rounding Completed    Plan of Care reviewed. Waiting for admission. Elimination needs assessed. Bed is locked and in lowest position. Call light within reach.

## 2022-01-11 ENCOUNTER — APPOINTMENT (OUTPATIENT)
Dept: GENERAL RADIOLOGY | Facility: HOSPITAL | Age: 62
DRG: 872 | End: 2022-01-11
Attending: INTERNAL MEDICINE
Payer: MEDICARE

## 2022-01-11 LAB
ANION GAP SERPL CALC-SCNC: 6 MMOL/L (ref 0–18)
BASOPHILS # BLD AUTO: 0.03 X10(3) UL (ref 0–0.2)
BASOPHILS NFR BLD AUTO: 0.5 %
BUN BLD-MCNC: 16 MG/DL (ref 7–18)
CALCIUM BLD-MCNC: 8.7 MG/DL (ref 8.5–10.1)
CHLORIDE SERPL-SCNC: 112 MMOL/L (ref 98–112)
CO2 SERPL-SCNC: 27 MMOL/L (ref 21–32)
CREAT BLD-MCNC: 0.99 MG/DL
CRP SERPL-MCNC: 6.62 MG/DL (ref ?–0.3)
EOSINOPHIL # BLD AUTO: 0.13 X10(3) UL (ref 0–0.7)
EOSINOPHIL NFR BLD AUTO: 2.1 %
ERYTHROCYTE [DISTWIDTH] IN BLOOD BY AUTOMATED COUNT: 13.7 %
ERYTHROCYTE [SEDIMENTATION RATE] IN BLOOD: 73 MM/HR
GLUCOSE BLD-MCNC: 101 MG/DL (ref 70–99)
HCT VFR BLD AUTO: 32.5 %
HGB BLD-MCNC: 10.6 G/DL
IMM GRANULOCYTES # BLD AUTO: 0.1 X10(3) UL (ref 0–1)
IMM GRANULOCYTES NFR BLD: 1.6 %
LYMPHOCYTES # BLD AUTO: 1.87 X10(3) UL (ref 1–4)
LYMPHOCYTES NFR BLD AUTO: 30.7 %
MCH RBC QN AUTO: 29.7 PG (ref 26–34)
MCHC RBC AUTO-ENTMCNC: 32.6 G/DL (ref 31–37)
MCV RBC AUTO: 91 FL
MONOCYTES # BLD AUTO: 0.66 X10(3) UL (ref 0.1–1)
MONOCYTES NFR BLD AUTO: 10.8 %
NEUTROPHILS # BLD AUTO: 3.31 X10 (3) UL (ref 1.5–7.7)
NEUTROPHILS # BLD AUTO: 3.31 X10(3) UL (ref 1.5–7.7)
NEUTROPHILS NFR BLD AUTO: 54.3 %
OSMOLALITY SERPL CALC.SUM OF ELEC: 301 MOSM/KG (ref 275–295)
PLATELET # BLD AUTO: 183 10(3)UL (ref 150–450)
POTASSIUM SERPL-SCNC: 4 MMOL/L (ref 3.5–5.1)
RBC # BLD AUTO: 3.57 X10(6)UL
SODIUM SERPL-SCNC: 145 MMOL/L (ref 136–145)
WBC # BLD AUTO: 6.1 X10(3) UL (ref 4–11)

## 2022-01-11 PROCEDURE — 86140 C-REACTIVE PROTEIN: CPT | Performed by: INTERNAL MEDICINE

## 2022-01-11 PROCEDURE — 80048 BASIC METABOLIC PNL TOTAL CA: CPT | Performed by: INTERNAL MEDICINE

## 2022-01-11 PROCEDURE — 85025 COMPLETE CBC W/AUTO DIFF WBC: CPT | Performed by: INTERNAL MEDICINE

## 2022-01-11 PROCEDURE — 85652 RBC SED RATE AUTOMATED: CPT | Performed by: INTERNAL MEDICINE

## 2022-01-11 PROCEDURE — 73562 X-RAY EXAM OF KNEE 3: CPT | Performed by: INTERNAL MEDICINE

## 2022-01-11 RX ORDER — CALCIUM CARBONATE 200(500)MG
500 TABLET,CHEWABLE ORAL 3 TIMES DAILY
Status: DISCONTINUED | OUTPATIENT
Start: 2022-01-11 | End: 2022-01-12

## 2022-01-11 RX ORDER — CEFAZOLIN SODIUM/WATER 2 G/20 ML
2 SYRINGE (ML) INTRAVENOUS EVERY 8 HOURS
Status: DISCONTINUED | OUTPATIENT
Start: 2022-01-11 | End: 2022-01-12

## 2022-01-11 RX ORDER — CEFADROXIL 500 MG/1
1 CAPSULE ORAL 2 TIMES DAILY
Qty: 56 CAPSULE | Refills: 0 | Status: SHIPPED | OUTPATIENT
Start: 2022-01-11 | End: 2022-01-25

## 2022-01-11 NOTE — PROGRESS NOTES
[facility] Progress Note     [facility]      SUBJECTIVE:  Feeling better  Strength improved  Afebrile    OBJECTIVE:  Temp:  [97.7 °F (36.5 °C)-98.6 °F (37 °C)] 98.6 °F (37 °C)  Pulse:  [60-74] 60  Resp:  [18] 18  BP: (126-147)/(71-89) 128/71 mg, 600 mg, Oral, Q6H PRN  Acidophilus/Pectin (PROBIOTIC) CAPS 1 capsule, 1 capsule, Oral, Daily  albuterol 108 (90 Base) MCG/ACT inhaler 2 puff, 2 puff, Inhalation, Q4H PRN  atenolol (TENORMIN) tab 50 mg, 50 mg, Oral, Daily  cyclobenzaprine (FLEXERIL) tab

## 2022-01-11 NOTE — PLAN OF CARE
Assumed patient care at [address] [place] [address]. A/Ox4. Room Air. NSR, on Tele. Lab draw. Right AC- infusing 0.9% NS @ 100ml/hr. ABX for UTI. Denies nausea/vomiting/diarrhea. Continent - Up adlib. Denies urgency when urinating. C/O back pain. PRN Acetaminophen for pain.  All

## 2022-01-11 NOTE — PROGRESS NOTES
INFECTIOUS DISEASE PROGRESS NOTE    Demi Venegas Patient Status:  Inpatient    1/15/1960 MRN [id number]   [facility] 3NE-A Attending Tamie Elizabeth MD   Hosp Day # 1 PCP Stephanie Ward rhonchi. Cardiovascular: S1, S2.  Regular rate and rhythm. No murmurs. Abdomen: Soft, nontender, nondistended. Positive bowel sounds. Musculoskeletal: Full range of motion of all extremities. No arthritis or deformity.  Well healed R TKA incision, no Radiology: CT ABDOMEN+PELVIS [place] Cough 2D RNDR(NO IV,NO ORAL)(CPT=74176)    Result Date: 1/8/2022  PROCEDURE:  CT ABDOMEN+PELVIS KIDNEYSTONE 2D RNDR(NO IV,NO ORAL)(CPT=74176)  COMPARISON:  None.   INDICATIONS:  fatigue, back pain  TECHNIQUE:  Unen R/o retention, PVR here has been ok  4. RIGOBERTO- mild, due to above. Now resolved  5.  Allergy to bactrim and PCN- itching, reports being able to tolerate keflex    PLAN:  - cont IV abx, can not dc home on cipro since she takes 07182 mg of calcium per day and t

## 2022-01-11 NOTE — PLAN OF CARE
Patient A&O x 4. Room air. NSR on tele. Resting comfortably in bed. Patient eager to dc home today, feeling much better than yesterday. Blood cultures negative for day 1. ID following. IVF dc per hospitalist. Pt ambulating unit.  All needs met at this time, Date Of Previous Biopsy (Optional): 04/14/2021

## 2022-01-12 VITALS
BODY MASS INDEX: 38.98 KG/M2 | SYSTOLIC BLOOD PRESSURE: 149 MMHG | RESPIRATION RATE: 18 BRPM | HEART RATE: 65 BPM | DIASTOLIC BLOOD PRESSURE: 80 MMHG | TEMPERATURE: 98 F | OXYGEN SATURATION: 97 % | WEIGHT: 220 LBS | HEIGHT: 63 IN

## 2022-01-12 LAB
ANION GAP SERPL CALC-SCNC: 8 MMOL/L (ref 0–18)
BASOPHILS # BLD AUTO: 0.04 X10(3) UL (ref 0–0.2)
BASOPHILS NFR BLD AUTO: 0.7 %
BUN BLD-MCNC: 16 MG/DL (ref 7–18)
CALCIUM BLD-MCNC: 8.6 MG/DL (ref 8.5–10.1)
CHLORIDE SERPL-SCNC: 112 MMOL/L (ref 98–112)
CO2 SERPL-SCNC: 23 MMOL/L (ref 21–32)
CREAT BLD-MCNC: 1.01 MG/DL
EOSINOPHIL # BLD AUTO: 0.12 X10(3) UL (ref 0–0.7)
EOSINOPHIL NFR BLD AUTO: 2 %
ERYTHROCYTE [DISTWIDTH] IN BLOOD BY AUTOMATED COUNT: 13.7 %
GLUCOSE BLD-MCNC: 105 MG/DL (ref 70–99)
HCT VFR BLD AUTO: 36.4 %
HGB BLD-MCNC: 11.2 G/DL
IMM GRANULOCYTES # BLD AUTO: 0.06 X10(3) UL (ref 0–1)
IMM GRANULOCYTES NFR BLD: 1 %
LYMPHOCYTES # BLD AUTO: 1.81 X10(3) UL (ref 1–4)
LYMPHOCYTES NFR BLD AUTO: 30.4 %
MCH RBC QN AUTO: 29.9 PG (ref 26–34)
MCHC RBC AUTO-ENTMCNC: 30.8 G/DL (ref 31–37)
MCV RBC AUTO: 97.1 FL
MONOCYTES # BLD AUTO: 0.46 X10(3) UL (ref 0.1–1)
MONOCYTES NFR BLD AUTO: 7.7 %
NEUTROPHILS # BLD AUTO: 3.47 X10 (3) UL (ref 1.5–7.7)
NEUTROPHILS # BLD AUTO: 3.47 X10(3) UL (ref 1.5–7.7)
NEUTROPHILS NFR BLD AUTO: 58.2 %
OSMOLALITY SERPL CALC.SUM OF ELEC: 298 MOSM/KG (ref 275–295)
PLATELET # BLD AUTO: 247 10(3)UL (ref 150–450)
POTASSIUM SERPL-SCNC: 4 MMOL/L (ref 3.5–5.1)
RBC # BLD AUTO: 3.75 X10(6)UL
SODIUM SERPL-SCNC: 143 MMOL/L (ref 136–145)
WBC # BLD AUTO: 6 X10(3) UL (ref 4–11)

## 2022-01-12 PROCEDURE — 85025 COMPLETE CBC W/AUTO DIFF WBC: CPT | Performed by: INTERNAL MEDICINE

## 2022-01-12 PROCEDURE — 80048 BASIC METABOLIC PNL TOTAL CA: CPT | Performed by: INTERNAL MEDICINE

## 2022-01-12 RX ORDER — CALCIUM CARBONATE 200(500)MG
1000 TABLET,CHEWABLE ORAL 4 TIMES DAILY
Status: DISCONTINUED | OUTPATIENT
Start: 2022-01-12 | End: 2022-01-12

## 2022-01-12 RX ORDER — CALCIUM CARBONATE 200(500)MG
1000 TABLET,CHEWABLE ORAL 4 TIMES DAILY
Refills: 0 | Status: SHIPPED | COMMUNITY
Start: 2022-01-12

## 2022-01-12 NOTE — PLAN OF CARE
A&Ox4. On room air, lungs clear. Abdomen soft and nontender, BS active. Denies N/V/D. NSR on tele. C/o headache -PRN Tylenol given with positive result. Receiving IV Ancef q8h for bacteremia. Patient ambulated unit.  Plan is for patient to discharge home wi

## 2022-01-12 NOTE — PLAN OF CARE
NURSING DISCHARGE NOTE    Discharged Home via Wheelchair. Accompanied by Support staff  Belongings Taken by patient/family. Pt discharged in calm, stable status to home. Discharge paperwork provided & discussed, pt verbalized understanding.  PIV removed

## 2022-01-12 NOTE — DISCHARGE SUMMARY
General Medicine Discharge Summary     Patient ID:  Power Dhillon  64year old  1/15/1960    Admit date: 1/9/2022    Discharge date and time: 1/12/22    Attending Physician: Lanny Garg MD     Primary Care Physician: Daljit Batista MD medications    calcium carbonate antacid 500 MG Oral Chew Tab  Chew 2 tablets (1,000 mg total) by mouth in the morning, at noon, in the evening, and at bedtime.     cefadroxil 500 MG Oral Cap  Take 2 capsules (1,000 mg total) by mouth 2 (two) times daily fo agree with therapeutic plan as outlined above.      Thank Bridgett Kim MD

## 2022-01-12 NOTE — PLAN OF CARE
Assumed pt care at 0730. A&Ox4. VSS. Room air. NSR on tele. Carb controlled diet, denies N/V. Denies pain. R wrist PIV SL. IV Ancef Q8H. Voiding, up ad grayson to bathroom. Lovenox subcutaneous for VTE prevention. Pt updated with POC.       Problem: Patient/Fam

## 2022-01-13 LAB — E COLI DNA BLD POS QL NAA+NON-PROBE: DETECTED

## 2022-03-27 ENCOUNTER — HOSPITAL ENCOUNTER (OUTPATIENT)
Age: 62
Discharge: HOME | End: 2022-03-27
Payer: MEDICARE

## 2022-03-27 VITALS
OXYGEN SATURATION: 97 % | RESPIRATION RATE: 18 BRPM | BODY MASS INDEX: 37.56 KG/M2 | SYSTOLIC BLOOD PRESSURE: 148 MMHG | DIASTOLIC BLOOD PRESSURE: 88 MMHG | HEIGHT: 64 IN | TEMPERATURE: 98 F | HEART RATE: 64 BPM | WEIGHT: 220 LBS

## 2022-03-27 DIAGNOSIS — H00.025 HORDEOLUM INTERNUM OF LEFT LOWER EYELID: ICD-10-CM

## 2022-03-27 DIAGNOSIS — R53.81 MALAISE: Primary | ICD-10-CM

## 2022-03-27 LAB
POCT BILIRUBIN URINE: NEGATIVE
POCT GLUCOSE URINE: NEGATIVE MG/DL
POCT KETONE URINE: NEGATIVE MG/DL
POCT LEUKOCYTE ESTERASE URINE: NEGATIVE
POCT NITRITE URINE: NEGATIVE
POCT PROTEIN URINE: NEGATIVE MG/DL
POCT SPECIFIC GRAVITY URINE: 1.03
POCT URINE CLARITY: CLEAR
POCT URINE COLOR: YELLOW
POCT UROBILINOGEN URINE: 0.2 MG/DL

## 2022-03-27 PROCEDURE — 99203 OFFICE O/P NEW LOW 30 MIN: CPT | Performed by: PHYSICIAN ASSISTANT

## 2022-03-27 PROCEDURE — 81002 URINALYSIS NONAUTO W/O SCOPE: CPT | Performed by: PHYSICIAN ASSISTANT

## 2022-03-27 RX ORDER — ERYTHROMYCIN 5 MG/G
1 OINTMENT OPHTHALMIC EVERY 6 HOURS
Qty: 1 G | Refills: 0 | Status: SHIPPED | OUTPATIENT
Start: 2022-03-27 | End: 2022-04-03

## 2022-03-27 NOTE — ED INITIAL ASSESSMENT (HPI)
Patient has been recovering from sepsis in  January. She has been feeling run down the last couple days. She now has drainage from left eye. She also has a little growth on her eyelid and is going to see a doctor due to concern that it is a cancerous growth.

## 2022-06-13 ENCOUNTER — OFFICE VISIT (OUTPATIENT)
Dept: FAMILY MEDICINE CLINIC | Facility: CLINIC | Age: 62
End: 2022-06-13
Payer: MEDICARE

## 2022-06-13 VITALS
WEIGHT: 220 LBS | RESPIRATION RATE: 18 BRPM | DIASTOLIC BLOOD PRESSURE: 108 MMHG | HEART RATE: 86 BPM | SYSTOLIC BLOOD PRESSURE: 172 MMHG | BODY MASS INDEX: 38.98 KG/M2 | OXYGEN SATURATION: 98 % | HEIGHT: 63 IN | TEMPERATURE: 98 F

## 2022-06-13 DIAGNOSIS — R03.0 ELEVATED BLOOD PRESSURE READING: ICD-10-CM

## 2022-06-13 DIAGNOSIS — R51.9 ACUTE NONINTRACTABLE HEADACHE, UNSPECIFIED HEADACHE TYPE: Primary | ICD-10-CM

## 2022-06-13 RX ORDER — MELOXICAM 15 MG/1
15 TABLET ORAL DAILY
COMMUNITY
Start: 2022-05-18

## 2022-06-13 NOTE — PROGRESS NOTES
Bishnu Elizabeth is a 58year old female who presents to [facility] with c/o severe headaches after removing a tick from her scalp. Unsure how long the tick was attached. Headaches present x 2-3 days. . She notes this does not feel like past usual headaches. Has tried ibuprofen with no relief. Reports gradual onset. Denies fevers, confusion, dizziness, chest pain, SOB, tinnitus, difficulty with speech, head injury/trauma, blurry vision, or abnormal motor function. Denies any other aggravating or relieving factors at home. Tolerates PO well at home. No n/v/d. Denies any other treatment attempts prior to arrival.  B/P 172/108. No rash or partial tick fragment noted to scalp. Discussed HPI and physical exam with pt. We discussed the limited diagnostic capacity of this clinic and there are dangerous conditions associated with her headaches and elevated blood pressure readings  that I can not fully rule out. I advised she be seen in the ED. Pt verbalized understanding and notes she will go to the [facility] ED. She declined medical transport      Accompanied by: self  After triage, higher acuity of care was recommended to Bishnu Elizabeth today. Rationale: Need for further evaluation and management outside the scope of practice for the walk in clinic  Site recommendation: Emergency Department. Pt notes she will go to [facility] ED here in [facility]  Patient declined transport via EMS or having someone else drive her. Patient/parent verbalized understanding of rationale for further evaluation and was stable upon discharge.

## 2023-03-20 ENCOUNTER — HOSPITAL ENCOUNTER (EMERGENCY)
Facility: HOSPITAL | Age: 63
Discharge: HOME | End: 2023-03-20
Attending: EMERGENCY MEDICINE
Payer: MEDICARE

## 2023-03-20 ENCOUNTER — APPOINTMENT (OUTPATIENT)
Dept: MRI IMAGING | Facility: HOSPITAL | Age: 63
End: 2023-03-20
Attending: EMERGENCY MEDICINE
Payer: MEDICARE

## 2023-03-20 VITALS
TEMPERATURE: 97 F | OXYGEN SATURATION: 97 % | HEIGHT: 63 IN | HEART RATE: 55 BPM | SYSTOLIC BLOOD PRESSURE: 158 MMHG | RESPIRATION RATE: 15 BRPM | WEIGHT: 230 LBS | BODY MASS INDEX: 40.75 KG/M2 | DIASTOLIC BLOOD PRESSURE: 89 MMHG

## 2023-03-20 DIAGNOSIS — R42 VERTIGO: Primary | ICD-10-CM

## 2023-03-20 DIAGNOSIS — N30.00 ACUTE CYSTITIS WITHOUT HEMATURIA: ICD-10-CM

## 2023-03-20 LAB
ALBUMIN SERPL-MCNC: 3.8 G/DL (ref 3.4–5)
ALBUMIN/GLOB SERPL: 1 {RATIO} (ref 1–2)
ALP LIVER SERPL-CCNC: 86 U/L
ALT SERPL-CCNC: 22 U/L
ANION GAP SERPL CALC-SCNC: 6 MMOL/L (ref 0–18)
AST SERPL-CCNC: 15 U/L (ref 15–37)
BASOPHILS # BLD AUTO: 0.04 X10(3) UL (ref 0–0.2)
BASOPHILS NFR BLD AUTO: 0.6 %
BILIRUB SERPL-MCNC: 0.5 MG/DL (ref 0.1–2)
BILIRUB UR QL STRIP.AUTO: NEGATIVE
BUN BLD-MCNC: 23 MG/DL (ref 7–18)
CALCIUM BLD-MCNC: 9.4 MG/DL (ref 8.5–10.1)
CHLORIDE SERPL-SCNC: 109 MMOL/L (ref 98–112)
CO2 SERPL-SCNC: 27 MMOL/L (ref 21–32)
COLOR UR AUTO: YELLOW
CREAT BLD-MCNC: 1.06 MG/DL
EOSINOPHIL # BLD AUTO: 0.17 X10(3) UL (ref 0–0.7)
EOSINOPHIL NFR BLD AUTO: 2.7 %
ERYTHROCYTE [DISTWIDTH] IN BLOOD BY AUTOMATED COUNT: 13.3 %
GFR SERPLBLD BASED ON 1.73 SQ M-ARVRAT: 59 ML/MIN/1.73M2 (ref 60–?)
GLOBULIN PLAS-MCNC: 3.8 G/DL (ref 2.8–4.4)
GLUCOSE BLD-MCNC: 105 MG/DL (ref 70–99)
GLUCOSE UR STRIP.AUTO-MCNC: NEGATIVE MG/DL
HCT VFR BLD AUTO: 41.8 %
HGB BLD-MCNC: 13.8 G/DL
IMM GRANULOCYTES # BLD AUTO: 0.01 X10(3) UL (ref 0–1)
IMM GRANULOCYTES NFR BLD: 0.2 %
KETONES UR STRIP.AUTO-MCNC: NEGATIVE MG/DL
LYMPHOCYTES # BLD AUTO: 2.53 X10(3) UL (ref 1–4)
LYMPHOCYTES NFR BLD AUTO: 39.5 %
MCH RBC QN AUTO: 29.3 PG (ref 26–34)
MCHC RBC AUTO-ENTMCNC: 33 G/DL (ref 31–37)
MCV RBC AUTO: 88.7 FL
MONOCYTES # BLD AUTO: 0.54 X10(3) UL (ref 0.1–1)
MONOCYTES NFR BLD AUTO: 8.4 %
NEUTROPHILS # BLD AUTO: 3.12 X10 (3) UL (ref 1.5–7.7)
NEUTROPHILS # BLD AUTO: 3.12 X10(3) UL (ref 1.5–7.7)
NEUTROPHILS NFR BLD AUTO: 48.6 %
NITRITE UR QL STRIP.AUTO: NEGATIVE
OSMOLALITY SERPL CALC.SUM OF ELEC: 298 MOSM/KG (ref 275–295)
PH UR STRIP.AUTO: 5 [PH] (ref 5–8)
PLATELET # BLD AUTO: 234 10(3)UL (ref 150–450)
POTASSIUM SERPL-SCNC: 4 MMOL/L (ref 3.5–5.1)
PROT SERPL-MCNC: 7.6 G/DL (ref 6.4–8.2)
PROT UR STRIP.AUTO-MCNC: NEGATIVE MG/DL
RBC # BLD AUTO: 4.71 X10(6)UL
RBC #/AREA URNS AUTO: >10 /HPF
SARS-COV-2 RNA RESP QL NAA+PROBE: NOT DETECTED
SODIUM SERPL-SCNC: 142 MMOL/L (ref 136–145)
SP GR UR STRIP.AUTO: 1.02 (ref 1–1.03)
UROBILINOGEN UR STRIP.AUTO-MCNC: 2 MG/DL
WBC # BLD AUTO: 6.4 X10(3) UL (ref 4–11)

## 2023-03-20 PROCEDURE — 70549 MR ANGIOGRAPH NECK W/O&W/DYE: CPT | Performed by: EMERGENCY MEDICINE

## 2023-03-20 PROCEDURE — 96375 TX/PRO/DX INJ NEW DRUG ADDON: CPT

## 2023-03-20 PROCEDURE — 70553 MRI BRAIN STEM W/O & W/DYE: CPT | Performed by: EMERGENCY MEDICINE

## 2023-03-20 PROCEDURE — 85025 COMPLETE CBC W/AUTO DIFF WBC: CPT | Performed by: EMERGENCY MEDICINE

## 2023-03-20 PROCEDURE — 87086 URINE CULTURE/COLONY COUNT: CPT | Performed by: EMERGENCY MEDICINE

## 2023-03-20 PROCEDURE — 96361 HYDRATE IV INFUSION ADD-ON: CPT

## 2023-03-20 PROCEDURE — 93010 ELECTROCARDIOGRAM REPORT: CPT

## 2023-03-20 PROCEDURE — 70544 MR ANGIOGRAPHY HEAD W/O DYE: CPT | Performed by: EMERGENCY MEDICINE

## 2023-03-20 PROCEDURE — 80053 COMPREHEN METABOLIC PANEL: CPT | Performed by: EMERGENCY MEDICINE

## 2023-03-20 PROCEDURE — 99285 EMERGENCY DEPT VISIT HI MDM: CPT

## 2023-03-20 PROCEDURE — 96374 THER/PROPH/DIAG INJ IV PUSH: CPT

## 2023-03-20 PROCEDURE — 70546 MR ANGIOGRAPH HEAD W/O&W/DYE: CPT | Performed by: EMERGENCY MEDICINE

## 2023-03-20 PROCEDURE — 93005 ELECTROCARDIOGRAM TRACING: CPT

## 2023-03-20 PROCEDURE — 81001 URINALYSIS AUTO W/SCOPE: CPT | Performed by: EMERGENCY MEDICINE

## 2023-03-20 PROCEDURE — A9575 INJ GADOTERATE MEGLUMI 0.1ML: HCPCS

## 2023-03-20 RX ORDER — SODIUM CHLORIDE 9 MG/ML
125 INJECTION, SOLUTION INTRAVENOUS CONTINUOUS
Status: DISCONTINUED | OUTPATIENT
Start: 2023-03-20 | End: 2023-03-21

## 2023-03-20 RX ORDER — METOCLOPRAMIDE HYDROCHLORIDE 5 MG/ML
10 INJECTION INTRAMUSCULAR; INTRAVENOUS ONCE
Status: COMPLETED | OUTPATIENT
Start: 2023-03-20 | End: 2023-03-20

## 2023-03-20 RX ORDER — CEPHALEXIN 500 MG/1
500 CAPSULE ORAL 3 TIMES DAILY
Qty: 30 CAPSULE | Refills: 0 | Status: SHIPPED | OUTPATIENT
Start: 2023-03-20 | End: 2023-03-30

## 2023-03-20 RX ORDER — METOCLOPRAMIDE 10 MG/1
10 TABLET ORAL 3 TIMES DAILY PRN
Qty: 20 TABLET | Refills: 0 | Status: SHIPPED | OUTPATIENT
Start: 2023-03-20 | End: 2023-04-19

## 2023-03-20 RX ORDER — GADOTERATE MEGLUMINE 376.9 MG/ML
20 INJECTION INTRAVENOUS
Status: COMPLETED | OUTPATIENT
Start: 2023-03-20 | End: 2023-03-20

## 2023-03-20 RX ORDER — LORAZEPAM 2 MG/ML
INJECTION INTRAMUSCULAR
Status: COMPLETED
Start: 2023-03-20 | End: 2023-03-20

## 2023-03-20 RX ORDER — LORAZEPAM 2 MG/ML
1 INJECTION INTRAMUSCULAR ONCE
Status: COMPLETED | OUTPATIENT
Start: 2023-03-20 | End: 2023-03-20

## 2023-03-20 RX ORDER — DIAZEPAM 5 MG/1
5 TABLET ORAL 3 TIMES DAILY PRN
Qty: 10 TABLET | Refills: 0 | Status: SHIPPED | OUTPATIENT
Start: 2023-03-20 | End: 2023-03-30

## 2023-03-20 RX ORDER — MECLIZINE HYDROCHLORIDE 25 MG/1
25 TABLET ORAL ONCE
Status: COMPLETED | OUTPATIENT
Start: 2023-03-20 | End: 2023-03-20

## 2023-03-20 RX ORDER — MECLIZINE HCL 25MG 25 MG/1
25 TABLET, CHEWABLE ORAL EVERY 6 HOURS PRN
Qty: 30 TABLET | Refills: 0 | Status: SHIPPED | OUTPATIENT
Start: 2023-03-20

## 2023-03-20 NOTE — ED INITIAL ASSESSMENT (HPI)
Patient presents to the ED with c/o dizziness that started on Saturday night. Patient has been vomiting, weak, and possibly had a syncopal episode. Patient states that today she was able to walk better, but she still is not feeling well.

## 2023-03-21 LAB
ATRIAL RATE: 59 BPM
P AXIS: 7 DEGREES
P-R INTERVAL: 116 MS
Q-T INTERVAL: 444 MS
QRS DURATION: 82 MS
QTC CALCULATION (BEZET): 439 MS
R AXIS: 10 DEGREES
T AXIS: 46 DEGREES
VENTRICULAR RATE: 59 BPM

## 2023-03-21 NOTE — DISCHARGE INSTRUCTIONS
Follow-up for further evaluation with primary physician or neurology. Call for appointment. Return if new or worse symptoms. Rest, plenty of fluids. Keflex as prescribed. Meclizine first.  Valium if needed. Metoclopramide as needed for nausea.

## 2023-04-18 ENCOUNTER — OFFICE VISIT (OUTPATIENT)
Dept: OPTOMETRY | Age: 63
End: 2023-04-18

## 2023-04-18 DIAGNOSIS — H52.223 REGULAR ASTIGMATISM OF BOTH EYES: ICD-10-CM

## 2023-04-18 DIAGNOSIS — H25.13 AGE-RELATED NUCLEAR CATARACT OF BOTH EYES: ICD-10-CM

## 2023-04-18 DIAGNOSIS — H52.03 HYPERMETROPIA OF BOTH EYES: ICD-10-CM

## 2023-04-18 DIAGNOSIS — H52.4 PRESBYOPIA: ICD-10-CM

## 2023-04-18 DIAGNOSIS — G43.109 OCULAR MIGRAINE: Primary | ICD-10-CM

## 2023-04-18 PROCEDURE — 92015 DETERMINE REFRACTIVE STATE: CPT | Performed by: OPTOMETRIST

## 2023-04-18 PROCEDURE — 99203 OFFICE O/P NEW LOW 30 MIN: CPT | Performed by: OPTOMETRIST

## 2023-04-18 ASSESSMENT — REFRACTION_WEARINGRX
OD_SPHERE: -1.50
OD_CYLINDER: +0.50
OS_AXIS: 038
SPECS_TYPE: SINGLE VISION
OS_SPHERE: -2.50
OS_CYLINDER: +0.75
OD_AXIS: 140

## 2023-04-18 ASSESSMENT — KERATOMETRY
OD_AXISANGLE_DEGREES: 090
OS_AXISANGLE2_DEGREES: 180
OS_K1POWER_DIOPTERS: 45.87
OD_K1POWER_DIOPTERS: 46.00
OS_AXISANGLE_DEGREES: 090
OD_AXISANGLE2_DEGREES: 180
OD_K2POWER_DIOPTERS: 45.50
OS_K2POWER_DIOPTERS: 45.87

## 2023-04-18 ASSESSMENT — VISUAL ACUITY
OD_CC: 20/20
METHOD: SNELLEN - LINEAR
OD_SC: J2
OS_CC: 20/20
OS_SC: J2
CORRECTION_TYPE: GLASSES

## 2023-04-18 ASSESSMENT — REFRACTION_MANIFEST
OD_CYLINDER: +0.50
OS_AXIS: 035
OS_CYLINDER: +0.75
OS_SPHERE: -2.00
OD_SPHERE: -1.25
OD_ADD: +2.00
OS_ADD: +2.00
OD_AXIS: 140

## 2023-04-18 ASSESSMENT — EXTERNAL EXAM - LEFT EYE: OS_EXAM: NORMAL

## 2023-04-18 ASSESSMENT — CONF VISUAL FIELD
OS_INFERIOR_NASAL_RESTRICTION: 0
OD_NORMAL: 1
OS_SUPERIOR_TEMPORAL_RESTRICTION: 0
OD_INFERIOR_TEMPORAL_RESTRICTION: 0
OD_INFERIOR_NASAL_RESTRICTION: 0
OD_SUPERIOR_TEMPORAL_RESTRICTION: 0
OS_INFERIOR_TEMPORAL_RESTRICTION: 0
OS_NORMAL: 1
OS_SUPERIOR_NASAL_RESTRICTION: 0
OD_SUPERIOR_NASAL_RESTRICTION: 0

## 2023-04-18 ASSESSMENT — SLIT LAMP EXAM - LIDS
COMMENTS: NORMAL
COMMENTS: NORMAL

## 2023-04-18 ASSESSMENT — EXTERNAL EXAM - RIGHT EYE: OD_EXAM: NORMAL

## 2023-12-01 ENCOUNTER — OFFICE VISIT (OUTPATIENT)
Dept: FAMILY MEDICINE CLINIC | Facility: CLINIC | Age: 63
End: 2023-12-01
Payer: COMMERCIAL

## 2023-12-01 VITALS
DIASTOLIC BLOOD PRESSURE: 96 MMHG | TEMPERATURE: 97 F | OXYGEN SATURATION: 99 % | RESPIRATION RATE: 20 BRPM | HEART RATE: 72 BPM | SYSTOLIC BLOOD PRESSURE: 144 MMHG

## 2023-12-01 DIAGNOSIS — I10 ESSENTIAL HYPERTENSION, BENIGN: ICD-10-CM

## 2023-12-01 DIAGNOSIS — J18.9 LINGULAR PNEUMONIA: ICD-10-CM

## 2023-12-01 DIAGNOSIS — R03.0 ELEVATED BLOOD PRESSURE READING: ICD-10-CM

## 2023-12-01 DIAGNOSIS — Z01.30 BLOOD PRESSURE CHECK: Primary | ICD-10-CM

## 2023-12-01 PROCEDURE — 3080F DIAST BP >= 90 MM HG: CPT | Performed by: PHYSICIAN ASSISTANT

## 2023-12-01 PROCEDURE — 3077F SYST BP >= 140 MM HG: CPT | Performed by: PHYSICIAN ASSISTANT

## 2023-12-01 PROCEDURE — 99213 OFFICE O/P EST LOW 20 MIN: CPT | Performed by: PHYSICIAN ASSISTANT

## 2023-12-01 RX ORDER — DOXYCYCLINE HYCLATE 100 MG
100 TABLET ORAL 2 TIMES DAILY
COMMUNITY
Start: 2023-11-30 | End: 2023-12-10

## 2023-12-01 RX ORDER — INHALER,ASSIST DEV,SMALL MASK
1 SPACER (EA) MISCELLANEOUS AS DIRECTED
COMMUNITY
Start: 2023-11-29

## 2023-12-01 RX ORDER — METHYLPREDNISOLONE 4 MG/1
1 TABLET ORAL AS DIRECTED
COMMUNITY
Start: 2023-11-29

## 2023-12-01 RX ORDER — ALBUTEROL SULFATE 2.5 MG/3ML
2.5 SOLUTION RESPIRATORY (INHALATION) EVERY 4 HOURS PRN
COMMUNITY
Start: 2023-11-29

## 2023-12-14 ENCOUNTER — HOSPITAL ENCOUNTER (EMERGENCY)
Facility: HOSPITAL | Age: 63
Discharge: HOME | End: 2023-12-14
Attending: EMERGENCY MEDICINE
Payer: MEDICARE

## 2023-12-14 ENCOUNTER — APPOINTMENT (OUTPATIENT)
Dept: GENERAL RADIOLOGY | Facility: HOSPITAL | Age: 63
End: 2023-12-14
Attending: EMERGENCY MEDICINE
Payer: MEDICARE

## 2023-12-14 VITALS
HEART RATE: 75 BPM | HEIGHT: 63 IN | OXYGEN SATURATION: 99 % | WEIGHT: 235 LBS | TEMPERATURE: 98 F | RESPIRATION RATE: 25 BRPM | DIASTOLIC BLOOD PRESSURE: 84 MMHG | BODY MASS INDEX: 41.64 KG/M2 | SYSTOLIC BLOOD PRESSURE: 126 MMHG

## 2023-12-14 DIAGNOSIS — J40 BRONCHITIS: Primary | ICD-10-CM

## 2023-12-14 LAB
ALBUMIN SERPL-MCNC: 3.4 G/DL (ref 3.4–5)
ALBUMIN/GLOB SERPL: 0.8 {RATIO} (ref 1–2)
ALP LIVER SERPL-CCNC: 95 U/L
ALT SERPL-CCNC: 18 U/L
ANION GAP SERPL CALC-SCNC: 5 MMOL/L (ref 0–18)
AST SERPL-CCNC: 19 U/L (ref 15–37)
BASOPHILS # BLD AUTO: 0.04 X10(3) UL (ref 0–0.2)
BASOPHILS NFR BLD AUTO: 0.6 %
BILIRUB SERPL-MCNC: 0.4 MG/DL (ref 0.1–2)
BUN BLD-MCNC: 21 MG/DL (ref 9–23)
CALCIUM BLD-MCNC: 9.3 MG/DL (ref 8.5–10.1)
CHLORIDE SERPL-SCNC: 110 MMOL/L (ref 98–112)
CO2 SERPL-SCNC: 27 MMOL/L (ref 21–32)
CREAT BLD-MCNC: 1 MG/DL
EGFRCR SERPLBLD CKD-EPI 2021: 63 ML/MIN/1.73M2 (ref 60–?)
EOSINOPHIL # BLD AUTO: 0.18 X10(3) UL (ref 0–0.7)
EOSINOPHIL NFR BLD AUTO: 2.9 %
ERYTHROCYTE [DISTWIDTH] IN BLOOD BY AUTOMATED COUNT: 13.2 %
GLOBULIN PLAS-MCNC: 4.2 G/DL (ref 2.8–4.4)
GLUCOSE BLD-MCNC: 111 MG/DL (ref 70–99)
HCT VFR BLD AUTO: 40.4 %
HGB BLD-MCNC: 13.5 G/DL
IMM GRANULOCYTES # BLD AUTO: 0.02 X10(3) UL (ref 0–1)
IMM GRANULOCYTES NFR BLD: 0.3 %
LYMPHOCYTES # BLD AUTO: 2.38 X10(3) UL (ref 1–4)
LYMPHOCYTES NFR BLD AUTO: 38.4 %
MCH RBC QN AUTO: 28.5 PG (ref 26–34)
MCHC RBC AUTO-ENTMCNC: 33.4 G/DL (ref 31–37)
MCV RBC AUTO: 85.2 FL
MONOCYTES # BLD AUTO: 0.48 X10(3) UL (ref 0.1–1)
MONOCYTES NFR BLD AUTO: 7.7 %
NEUTROPHILS # BLD AUTO: 3.1 X10 (3) UL (ref 1.5–7.7)
NEUTROPHILS # BLD AUTO: 3.1 X10(3) UL (ref 1.5–7.7)
NEUTROPHILS NFR BLD AUTO: 50.1 %
OSMOLALITY SERPL CALC.SUM OF ELEC: 298 MOSM/KG (ref 275–295)
PLATELET # BLD AUTO: 242 10(3)UL (ref 150–450)
POTASSIUM SERPL-SCNC: 4.4 MMOL/L (ref 3.5–5.1)
PROT SERPL-MCNC: 7.6 G/DL (ref 6.4–8.2)
RBC # BLD AUTO: 4.74 X10(6)UL
SODIUM SERPL-SCNC: 142 MMOL/L (ref 136–145)
WBC # BLD AUTO: 6.2 X10(3) UL (ref 4–11)

## 2023-12-14 PROCEDURE — 36415 COLL VENOUS BLD VENIPUNCTURE: CPT

## 2023-12-14 PROCEDURE — 85025 COMPLETE CBC W/AUTO DIFF WBC: CPT | Performed by: EMERGENCY MEDICINE

## 2023-12-14 PROCEDURE — 71045 X-RAY EXAM CHEST 1 VIEW: CPT | Performed by: EMERGENCY MEDICINE

## 2023-12-14 PROCEDURE — 99285 EMERGENCY DEPT VISIT HI MDM: CPT

## 2023-12-14 PROCEDURE — 85025 COMPLETE CBC W/AUTO DIFF WBC: CPT

## 2023-12-14 PROCEDURE — 80053 COMPREHEN METABOLIC PANEL: CPT

## 2023-12-14 PROCEDURE — 99284 EMERGENCY DEPT VISIT MOD MDM: CPT

## 2023-12-14 PROCEDURE — 80053 COMPREHEN METABOLIC PANEL: CPT | Performed by: EMERGENCY MEDICINE

## 2023-12-14 PROCEDURE — 94640 AIRWAY INHALATION TREATMENT: CPT

## 2023-12-14 RX ORDER — PREDNISONE 20 MG/1
40 TABLET ORAL DAILY
Qty: 8 TABLET | Refills: 0 | Status: SHIPPED | OUTPATIENT
Start: 2023-12-14 | End: 2023-12-18

## 2023-12-14 RX ORDER — PREDNISONE 20 MG/1
40 TABLET ORAL ONCE
Status: COMPLETED | OUTPATIENT
Start: 2023-12-14 | End: 2023-12-14

## 2023-12-14 RX ORDER — IPRATROPIUM BROMIDE AND ALBUTEROL SULFATE 2.5; .5 MG/3ML; MG/3ML
3 SOLUTION RESPIRATORY (INHALATION) ONCE
Status: COMPLETED | OUTPATIENT
Start: 2023-12-14 | End: 2023-12-14

## 2023-12-15 NOTE — DISCHARGE INSTRUCTIONS
Take the prednisone as prescribed over-the-counter cough or cold medications follow-up with your doctor in few days return if worse use your albuterol or nebulizer every 4 hours as needed.

## 2024-02-23 ENCOUNTER — OFFICE VISIT (OUTPATIENT)
Dept: FAMILY MEDICINE CLINIC | Facility: CLINIC | Age: 64
End: 2024-02-23
Payer: COMMERCIAL

## 2024-02-23 VITALS
DIASTOLIC BLOOD PRESSURE: 68 MMHG | RESPIRATION RATE: 18 BRPM | HEIGHT: 64 IN | OXYGEN SATURATION: 97 % | BODY MASS INDEX: 40.97 KG/M2 | WEIGHT: 240 LBS | HEART RATE: 64 BPM | TEMPERATURE: 98 F | SYSTOLIC BLOOD PRESSURE: 127 MMHG

## 2024-02-23 DIAGNOSIS — B00.1 HERPES LABIALIS: Primary | ICD-10-CM

## 2024-02-23 DIAGNOSIS — H10.31 ACUTE BACTERIAL CONJUNCTIVITIS OF RIGHT EYE: ICD-10-CM

## 2024-02-23 PROCEDURE — 99213 OFFICE O/P EST LOW 20 MIN: CPT | Performed by: FAMILY MEDICINE

## 2024-02-23 RX ORDER — TOBRAMYCIN 3 MG/ML
1 SOLUTION/ DROPS OPHTHALMIC EVERY 6 HOURS
Qty: 5 ML | Refills: 0 | Status: SHIPPED | OUTPATIENT
Start: 2024-02-23 | End: 2024-03-01

## 2024-02-23 RX ORDER — VALACYCLOVIR HYDROCHLORIDE 1 G/1
TABLET, FILM COATED ORAL
Qty: 12 TABLET | Refills: 0 | Status: SHIPPED | OUTPATIENT
Start: 2024-02-23

## 2024-02-23 NOTE — PROGRESS NOTES
Anuradha Venegas is a 64 year old female.    S:  Patient presents today with the following concerns:  Right eye pink eye.  Crusting.  Grandson had pink eye recently.  No pain or blurred vision.  History of herpes infection of the eye but patient states it does not feel like this.    Cold sore starting on left side today.  Would like refill on valtrex.    Fatigued.      Current Outpatient Medications   Medication Sig Dispense Refill    valACYclovir 1 G Oral Tab 2 tabs po every 12 hours for 1 day prn cold sores. 12 tablet 0    tobramycin 0.3 % Ophthalmic Solution Place 1 drop into the right eye every 6 (six) hours for 7 days. 5 mL 0    albuterol (2.5 MG/3ML) 0.083% Inhalation Nebu Soln Take 3 mL (2.5 mg total) by nebulization every 4 (four) hours as needed for Wheezing.      Spacer/Aero-Holding Chambers (EQ SPACE CHAMBER ANTI-STATIC) Does not apply Device Take 1 Bottle by mouth As Directed.      atenolol 50 MG Oral Tab Take 1 tablet (50 mg total) by mouth daily. 90 tablet 1    Nebulizers (MEDNEB NEBULIZ-REUSE-DISP KIT) Does not apply Misc       albuterol 108 (90 Base) MCG/ACT Inhalation Aero Soln Inhale 2 puffs into the lungs every 4 (four) hours as needed for Wheezing. 1 each 0    Respiratory Therapy Supplies (NEBULIZER/TUBING/MOUTHPIECE) Does not apply Kit Use with albuterol as needed 1 each 0    acetaminophen 500 MG Oral Tab Take 2 tablets (1,000 mg total) by mouth every 6 (six) hours as needed for Pain.      Nerve Stimulator (STANDARD TENS) Does not apply Device 1 Device by Does not apply route daily. 1 each 0    methylPREDNISolone 4 MG Oral Tablet Therapy Pack Take 1 tablet (4 mg total) by mouth As Directed. (Patient not taking: Reported on 2/23/2024)      Meclizine HCl (ANTIVERT) 25 MG Oral Chew Tab Chew 25 mg by mouth every 6 (six) hours as needed. (Patient not taking: Reported on 12/1/2023) 30 tablet 0    Meloxicam 15 MG Oral Tab Take 15 mg by mouth daily. (Patient not taking: Reported on 12/1/2023)       cyclobenzaprine 10 MG Oral Tab Take 1 tablet (10 mg total) by mouth daily as needed for Muscle spasms (with cpap). (Patient not taking: Reported on 12/1/2023) 30 tablet 0    calcium carbonate antacid 500 MG Oral Chew Tab Chew 2 tablets (1,000 mg total) by mouth in the morning, at noon, in the evening, and at bedtime. (Patient not taking: Reported on 12/1/2023)  0    IRON OR Take 1 tablet by mouth daily. (Patient not taking: Reported on 12/1/2023)      Ascorbic Acid (VITAMIN C OR) Take 1 tablet by mouth daily. (Patient not taking: Reported on 12/1/2023)      metFORMIN HCl 500 MG Oral Tab Take 1 tablet (500 mg total) by mouth daily with breakfast. (Patient not taking: Reported on 12/1/2023) 90 tablet 1     Patient Active Problem List   Diagnosis    Gout, unspecified    Essential hypertension, benign    Morbid obesity with BMI of 40.0-44.9, adult ([facility])    Chronic pain syndrome    Other malaise and fatigue    Insomnia, unspecified    Knee internal derangement    Fibromyalgia    Depression    Post traumatic stress disorder    S/P gastric bypass    Vitamin D deficiency    Migraine headache    Osteoarthritis of left knee    Osteoarthritis of right knee    Microscopic hematuria    History of renal stone    History of bladder cancer    Right knee pain    Multiple joint pain    S/P total knee replacement    Ankylosis of lower leg joint    Gross hematuria    Cystitis cystica    Calcium oxalate crystals in urine    Constipation    Suprapubic pressure    Cyst of right ovary    Cervical radiculitis    Foraminal stenosis of cervical region    Lumbar radiculitis    DDD (degenerative disc disease), lumbar    Encounter for therapeutic drug monitoring    Chronic left shoulder pain    Pain of left upper extremity    Atherosclerosis of aorta ([facility])    Dysosmia    PAH (pulmonary artery hypertension) ([facility])    Chronic bilateral thoracic back pain    Lumbar facet arthropathy    Renal colic    Primary hyperparathyroidism ([facility])    Calcium  nephrolithiasis    Other osteoporosis without current pathological fracture    Pyelonephritis    Bacteremia     Family History   Problem Relation Age of Onset    Hypertension Father     Cancer Father         skin cancer    Other (Other) Father     Other (colon polyps) Father     Hypertension Mother     Heart Disorder Mother         mi    Other (emphysema) Mother     Cancer Paternal Grandfather         colon cancer    Psychiatric Brother         substance abuse    Psychiatric Brother         substance abuse    Psychiatric Brother         substance abuse    Breast Cancer Sister         dx 50       REVIEW OF SYSTEMS:  GENERAL: feels well otherwise  SKIN: denies any unusual skin lesions  EYES:denies vision change  LUNGS: denies shortness of breath with exertion  CARDIOVASCULAR: denies chest pain on exertion  GI: denies abdominal pain.  No N/V/D/C  : denies dysuria  MUSCULOSKELETAL: denies back pain  NEURO: denies headaches    EXAM:  /68   Pulse 64   Temp 97.6 °F (36.4 °C)   Resp 18   Ht 5' 4\" (1.626 m)   Wt 240 lb (108.9 kg)   LMP 09/01/2010   SpO2 97%   BMI 41.20 kg/m²   GENERAL: well developed, well nourished,in no apparent distress.  Mood, affect, and behavior are normal.  SKIN: no rashes,no suspicious lesions  HEENT: atraumatic, normocephalic,ears and throat are clear  EYES:PERRLA, EOMI  Right eye conjunctiva mildly injected,  no mucous present currently.  NECK: supple,no adenopathy  LUNGS: CTA, no RRW  CARDIO: RRR without murmur  EXTREMITIES: no edema  NEURO: Oriented times three,cranial nerves are intact,motor and sensory are grossly intact    ASSESSMENT AND PLAN:  Anuradha Venegas is a 64 year old female.  Encounter Diagnoses   Name Primary?    Herpes labialis Yes    Acute bacterial conjunctivitis of right eye        No orders of the defined types were placed in this encounter.    Meds & Refills for this Visit:  Requested Prescriptions     Signed Prescriptions Disp Refills    valACYclovir 1 G Oral  Tab 12 tablet 0     Si tabs po every 12 hours for 1 day prn cold sores.    tobramycin 0.3 % Ophthalmic Solution 5 mL 0     Sig: Place 1 drop into the right eye every 6 (six) hours for 7 days.     Imaging & Consults:  None    Tobramycin ophth soln as above.  Contagious for 24 hours after starting drops.  Wash hands frequently.  Do not share towels, pillow cases, washcloths.  Follow up with ophthalmologist if symptoms do not improve or if they worsen.  Warm compresses to remove crusting.  Refilled valtrex for cold sore.    Follow up if symptoms change, worsen, do not improve.    Patient verbalizes understanding of plan.        No follow-ups on file.

## 2024-04-19 ENCOUNTER — OFFICE VISIT (OUTPATIENT)
Dept: FAMILY MEDICINE CLINIC | Facility: CLINIC | Age: 64
End: 2024-04-19
Payer: MEDICARE

## 2024-04-19 VITALS
WEIGHT: 230 LBS | TEMPERATURE: 98 F | HEIGHT: 64 IN | SYSTOLIC BLOOD PRESSURE: 134 MMHG | DIASTOLIC BLOOD PRESSURE: 80 MMHG | RESPIRATION RATE: 18 BRPM | BODY MASS INDEX: 39.27 KG/M2 | HEART RATE: 65 BPM | OXYGEN SATURATION: 96 %

## 2024-04-19 DIAGNOSIS — J02.9 SORE THROAT: Primary | ICD-10-CM

## 2024-04-19 DIAGNOSIS — B34.9 VIRAL SYNDROME: ICD-10-CM

## 2024-04-19 LAB
CONTROL LINE PRESENT WITH A CLEAR BACKGROUND (YES/NO): YES YES/NO
KIT LOT #: NORMAL NUMERIC
OPERATOR ID: NORMAL
POCT LOT NUMBER: NORMAL
RAPID SARS-COV-2 BY PCR: NOT DETECTED
STREP GRP A CUL-SCR: NEGATIVE

## 2024-04-19 PROCEDURE — 87081 CULTURE SCREEN ONLY: CPT | Performed by: NURSE PRACTITIONER

## 2024-04-19 NOTE — PROGRESS NOTES
CHIEF COMPLAINT:     Chief Complaint   Patient presents with    Sore Throat     Started last night         HPI:   Anuradha Venegas is a 64 year old female presents to clinic with symptoms of sore throat. Patient has had for 1 days.  Reports started last night.  Had + Exposure to strep from sister. . Symptoms have been consistent since onset.  Reports some fatigue for past week.  Last night sore /scratchy throat started with some body aches.  Reports woke up this morning \"feeling sick\".  MIld congestion, body aches to upper body/neck area. Denies any fever, headache, vision changes, coughing.  Reports vertigo on and off, but this is normal for her. . Has remote history of strep. No one is sick at home right now.  Treating symptoms with: tylenol.     Current Outpatient Medications   Medication Sig Dispense Refill    valACYclovir 1 G Oral Tab 2 tabs po every 12 hours for 1 day prn cold sores. 12 tablet 0    albuterol (2.5 MG/3ML) 0.083% Inhalation Nebu Soln Take 3 mL (2.5 mg total) by nebulization every 4 (four) hours as needed for Wheezing.      Spacer/Aero-Holding Chambers (EQ SPACE CHAMBER ANTI-STATIC) Does not apply Device Take 1 Bottle by mouth As Directed.      atenolol 50 MG Oral Tab Take 1 tablet (50 mg total) by mouth daily. 90 tablet 1    Nebulizers (MEDNEB NEBULIZ-REUSE-DISP KIT) Does not apply Misc       albuterol 108 (90 Base) MCG/ACT Inhalation Aero Soln Inhale 2 puffs into the lungs every 4 (four) hours as needed for Wheezing. 1 each 0    Respiratory Therapy Supplies (NEBULIZER/TUBING/MOUTHPIECE) Does not apply Kit Use with albuterol as needed 1 each 0    acetaminophen 500 MG Oral Tab Take 2 tablets (1,000 mg total) by mouth every 6 (six) hours as needed for Pain.      Nerve Stimulator (STANDARD TENS) Does not apply Device 1 Device by Does not apply route daily. 1 each 0    methylPREDNISolone 4 MG Oral Tablet Therapy Pack Take 1 tablet (4 mg total) by mouth As Directed. (Patient not taking: Reported on  2/23/2024)      Meclizine HCl (ANTIVERT) 25 MG Oral Chew Tab Chew 25 mg by mouth every 6 (six) hours as needed. (Patient not taking: Reported on 12/1/2023) 30 tablet 0    Meloxicam 15 MG Oral Tab Take 15 mg by mouth daily. (Patient not taking: Reported on 12/1/2023)      cyclobenzaprine 10 MG Oral Tab Take 1 tablet (10 mg total) by mouth daily as needed for Muscle spasms (with cpap). (Patient not taking: Reported on 12/1/2023) 30 tablet 0    calcium carbonate antacid 500 MG Oral Chew Tab Chew 2 tablets (1,000 mg total) by mouth in the morning, at noon, in the evening, and at bedtime. (Patient not taking: Reported on 12/1/2023)  0    IRON OR Take 1 tablet by mouth daily. (Patient not taking: Reported on 12/1/2023)      Ascorbic Acid (VITAMIN C OR) Take 1 tablet by mouth daily. (Patient not taking: Reported on 12/1/2023)      metFORMIN HCl 500 MG Oral Tab Take 1 tablet (500 mg total) by mouth daily with breakfast. (Patient not taking: Reported on 12/1/2023) 90 tablet 1      Past Medical History:    Anesthesia complication    Anxiety state, unspecified    xanax    ASTHMA    Has not needed inhaler in over a year    Asthma (HCC)    Depression    Difficult intubation    Dizziness and giddiness    unknown etiology of vertigo    Essential hypertension    Fibromyalgia    Hematuria    High blood pressure    Hx of diseases NEC     h/o parvovirus b 19    Hx of diseases NEC    optical herpes    HYPERTENSION    Migraine, unspecified, without mention of intractable migraine without mention of status migrainosus    Migraines    OBESITY    Obesity, unspecified    OSTEOPOROSIS    PONV (postoperative nausea and vomiting)    Sepsis (HCC)    Spinal stenosis    Undiagnosed cardiac murmurs    Unspecified sleep apnea    has C-Pap but doesn't use it.    Valvular disease    pt states leaking valves /no problems    Vertigo    Visual impairment    glasses      Social History:  Social History     Socioeconomic History    Marital status: Legally      Number of children: 3   Occupational History    Occupation: disability   Tobacco Use    Smoking status: Never     Passive exposure: Never    Smokeless tobacco: Never   Vaping Use    Vaping status: Never Used   Substance and Sexual Activity    Alcohol use: Not Currently    Drug use: No    Sexual activity: Yes     Partners: Male   Other Topics Concern    Caffeine Concern Yes     Comment: 3-4 diet pop a day    Exercise No   Social History Narrative    .  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 First  gone. 1d- attila 34- [place] on her own and has 4 children - 13,12,10, 7 unmarried and father is in long-term for 27 more years. Single parent and on public aid and food stamps and dropped out of school to be  ; 1s-rafael-33 [place]  and lives in [place]-  to labor and delivery nurse- 3 children- 4- boy hearing loss,2,1 ; 1s- 16 gloria senior at [place] [employer]- honor school and honors student and 3 sport athlete and wants [place] and tore collateral ligament of right elbow ; 5 stepchildren and 3 still alive. On disability since 1996. No exercise - stretches and walks. She lives in [place] in house she bought at age 17.  She lives with her boyfriend.      Social Determinants of Health     Food Insecurity: No Food Insecurity (2/7/2023)    Received from [facility], [facility]    Food Insecurity     Currently or in the past 3 months, have you worried your food would run out before you had money to buy more?: No     In the past 12 months, have you run out of food or been unable to get more?: No    Received from [facility], [facility]    Social Connections    Received from [facility], [facility]    Housing Stability        REVIEW OF SYSTEMS:   GENERAL HEALTH: feels well otherwise  SKIN: denies  any unusual skin lesions or rashes  HEENT: denies ear pain, See HPI  RESPIRATORY: denies shortness of breath, wheezing, or cough  CARDIOVASCULAR: denies chest pain, palpitations   GI: denies abdominal pain, constipation and diarrhea  NEURO: denies dizziness or lightheadedness    EXAM:   /80   Pulse 65   Temp 97.9 °F (36.6 °C)   Resp 18   Ht 5' 4\" (1.626 m)   Wt 230 lb (104.3 kg)   LMP 09/01/2010   SpO2 96%   BMI 39.48 kg/m²   GENERAL: well developed, well nourished,in no apparent distress  SKIN: no rashes,no suspicious lesions  HEAD: atraumatic, normocephalic  EYES: conjunctiva clear, EOM intact  EARS: TM's clear, non-injected, no bulging, retraction, or fluid  NOSE: nostrils patent, no exudates, nasal mucosa pink and noninflamed  THROAT: oral mucosa pink, moist. Posterior pharynx mildly erythematous.  No exudates. Tonsils 1/4.   NECK: supple, non-tender, FROM of neck.   LUNGS: clear to auscultation bilaterally, no wheezes or rhonchi. No crackles/rales, good air movement throughout. Breathing is non labored.  CARDIO: RRR without murmur  EXTREMITIES: no cyanosis, clubbing or edema  LYMPH: Positive anterior cervical lymphadenopathy.  No posterior cervical or occipital lymphadenopathy.    Recent Results (from the past 24 hour(s))   Strep A Assay W/Optic    Collection Time: 04/19/24  4:36 PM   Result Value Ref Range    Strep Grp A Screen Negative Negative    Control Line Present with a clear background (yes/no) yes Yes/No    Kit Lot # [id number] Numeric    Kit Expiration Date 03/01/2025 Date   COVID-19 LAB TEST NON-CDC    Collection Time: 04/19/24  4:50 PM    Specimen: Nares   Result Value Ref Range    Rapid SARS-CoV-2 by PCR Not Detected Not Detected    POCT Lot Number [id number]     POCT Expiration Date 08/28/2015     POCT Procedure Control Control Valid Control Valid     ,603        ASSESSMENT AND PLAN:   Anuradha Venegas is a 64 year old female who presents with   ASSESSMENT:   Encounter  Diagnoses   Name Primary?    Sore throat Yes    Viral syndrome        PLAN: In case of allergies, start flonase daily. Continue xyzal. Negative rapid strep and Covid. Will sent throat culture given exposures and reports sister's test came back \"negative first time\" as well.   Motrin per package instructions for pain.  Follow up with PCP if no improvement in 2-3 days.   Comfort care as described in Patient Instructions. If inability to swallow, tolerate secretions, or any difficulty breathing, seek emergent care.  Patient/Parent has given us consent to send out a culture and understand that they will be contacted in 2-3 days with culture results.      Meds & Refills for this Visit:  Requested Prescriptions      No prescriptions requested or ordered in this encounter       Risks, benefits, and side effects of medication explained and discussed.    There are no Patient Instructions on file for this visit.    The patient indicates understanding of these issues and agrees to the plan.  The patient is asked to return if sx's persist or worsen.    Increase fluids, Motrin/Tylenol prn, rest.  Patient is to follow up if fever greater than or equal to 100.4 persists for 72 hours.

## 2024-04-22 ENCOUNTER — OFFICE VISIT (OUTPATIENT)
Dept: FAMILY MEDICINE CLINIC | Facility: CLINIC | Age: 64
End: 2024-04-22
Payer: COMMERCIAL

## 2024-04-22 VITALS
HEIGHT: 64 IN | BODY MASS INDEX: 39.27 KG/M2 | SYSTOLIC BLOOD PRESSURE: 156 MMHG | DIASTOLIC BLOOD PRESSURE: 88 MMHG | WEIGHT: 230 LBS | TEMPERATURE: 97 F | OXYGEN SATURATION: 96 % | HEART RATE: 77 BPM | RESPIRATION RATE: 18 BRPM

## 2024-04-22 DIAGNOSIS — J06.9 UPPER RESPIRATORY TRACT INFECTION, UNSPECIFIED TYPE: Primary | ICD-10-CM

## 2024-04-22 PROCEDURE — 99213 OFFICE O/P EST LOW 20 MIN: CPT | Performed by: NURSE PRACTITIONER

## 2024-04-22 NOTE — PROGRESS NOTES
CHIEF COMPLAINT:     Chief Complaint   Patient presents with    Cough     Nasal congestion, fatigue, body aches, headache   Started Thursday Friday, seen Friday negative for covid / strep   No fevers        HPI:   Anuradha Venegas is a 64 year old female who presents for upper respiratory symptoms for  6 days. Patient reports congestion, low grade fever, dry cough, sinus pain. Symptoms have been worsening since onset.  Treating symptoms with otc medication. Pt feels this is a sinus infection and would like an abx.      Current Outpatient Medications   Medication Sig Dispense Refill    valACYclovir 1 G Oral Tab 2 tabs po every 12 hours for 1 day prn cold sores. 12 tablet 0    methylPREDNISolone 4 MG Oral Tablet Therapy Pack Take 1 tablet (4 mg total) by mouth As Directed. (Patient not taking: Reported on 2/23/2024)      albuterol (2.5 MG/3ML) 0.083% Inhalation Nebu Soln Take 3 mL (2.5 mg total) by nebulization every 4 (four) hours as needed for Wheezing.      Spacer/Aero-Holding Chambers (EQ SPACE CHAMBER ANTI-STATIC) Does not apply Device Take 1 Bottle by mouth As Directed.      Meclizine HCl (ANTIVERT) 25 MG Oral Chew Tab Chew 25 mg by mouth every 6 (six) hours as needed. (Patient not taking: Reported on 12/1/2023) 30 tablet 0    Meloxicam 15 MG Oral Tab Take 15 mg by mouth daily. (Patient not taking: Reported on 12/1/2023)      cyclobenzaprine 10 MG Oral Tab Take 1 tablet (10 mg total) by mouth daily as needed for Muscle spasms (with cpap). (Patient not taking: Reported on 12/1/2023) 30 tablet 0    atenolol 50 MG Oral Tab Take 1 tablet (50 mg total) by mouth daily. 90 tablet 1    Nebulizers (MEDNEB NEBULIZ-REUSE-DISP KIT) Does not apply Misc       calcium carbonate antacid 500 MG Oral Chew Tab Chew 2 tablets (1,000 mg total) by mouth in the morning, at noon, in the evening, and at bedtime. (Patient not taking: Reported on 12/1/2023)  0    albuterol 108 (90 Base) MCG/ACT Inhalation Aero Soln Inhale 2 puffs into the  lungs every 4 (four) hours as needed for Wheezing. 1 each 0    Respiratory Therapy Supplies (NEBULIZER/TUBING/MOUTHPIECE) Does not apply Kit Use with albuterol as needed 1 each 0    acetaminophen 500 MG Oral Tab Take 2 tablets (1,000 mg total) by mouth every 6 (six) hours as needed for Pain.      Nerve Stimulator (STANDARD TENS) Does not apply Device 1 Device by Does not apply route daily. 1 each 0    IRON OR Take 1 tablet by mouth daily. (Patient not taking: Reported on 12/1/2023)      Ascorbic Acid (VITAMIN C OR) Take 1 tablet by mouth daily. (Patient not taking: Reported on 12/1/2023)      metFORMIN HCl 500 MG Oral Tab Take 1 tablet (500 mg total) by mouth daily with breakfast. (Patient not taking: Reported on 12/1/2023) 90 tablet 1      Past Medical History:    Anesthesia complication    Anxiety state, unspecified    xanax    ASTHMA    Has not needed inhaler in over a year    Asthma (HCC)    Depression    Difficult intubation    Dizziness and giddiness    unknown etiology of vertigo    Essential hypertension    Fibromyalgia    Hematuria    High blood pressure    Hx of diseases NEC     h/o parvovirus b 19    Hx of diseases NEC    optical herpes    HYPERTENSION    Migraine, unspecified, without mention of intractable migraine without mention of status migrainosus    Migraines    OBESITY    Obesity, unspecified    OSTEOPOROSIS    PONV (postoperative nausea and vomiting)    Sepsis (HCC)    Spinal stenosis    Undiagnosed cardiac murmurs    Unspecified sleep apnea    has C-Pap but doesn't use it.    Valvular disease    pt states leaking valves /no problems    Vertigo    Visual impairment    glasses      Past Surgical History:   Procedure Laterality Date    Appendectomy  1962    Cholecystectomy      Colonoscopy  2008    Colonoscopy,diagnostic  4/3/08    Performed by ALIVIA ESCOBEDO at [facility], [facility]    D & c  10/5/15    hysteroscopy D&C    Injection, w/wo contrast, dx/therapeutic substance,  epidural/subarachnoid; cervical/thoracic N/A 3/11/2016    Procedure: CERVICAL EPIDURAL;  Surgeon: Herminio Arboleda MD;  Location: [facility]    Injection, w/wo contrast, dx/therapeutic substance, epidural/subarachnoid; cervical/thoracic N/A 4/4/2016    Procedure: CERVICAL EPIDURAL;  Surgeon: Herminio Arboleda MD;  Location: [facility]    Injection, w/wo contrast, dx/therapeutic substance, epidural/subarachnoid; cervical/thoracic N/A 5/13/2016    Procedure: CERVICAL EPIDURAL;  Surgeon: Herminio Arboleda MD;  Location: [facility]    Knee replacement surgery  7/25/13    Right TKR by Lombardi    Orthopedic surg (pbp)  7/25/13    RIGHT KNEE REPLACEMENT    Other surgical history  11-15-12 [facility]    Exc recurrent lipoma on back    Other surgical history      bladder tumor    Other surgical history      gastric bypass    Other surgical history      ganglion left wrist    Other surgical history      meniscus pt cannot recall which knee    Other surgical history  06/20/14    Cystoscopy - Dr. Glez     Other surgical history  04/05/2021    Cystoscopy w/ Dr Dove    Other surgical history  06/01/2021    Cysto Stent removal Dr. Wright     Patient documented not to have experienced any of the following events N/A 3/11/2016    Procedure: CERVICAL EPIDURAL;  Surgeon: Herminio Arboleda MD;  [facility]: [facility]    Patient documented not to have experienced any of the following events N/A 4/4/2016    Procedure: CERVICAL EPIDURAL;  Surgeon: Herminio Arboleda MD;  Location: [facility]    Patient documented not to have experienced any of the following events N/A 5/13/2016    Procedure: CERVICAL EPIDURAL;  Surgeon: Herminio Arboleda MD;  Location[facility]    Patient withough preoperative order for iv antibiotic surgical site infection prophylaxis. N/A 3/11/2016    Procedure: CERVICAL EPIDURAL;  Surgeon: Herminio Arboleda  MD;  Location: [facility]    Patient withough preoperative order for iv antibiotic surgical site infection prophylaxis. N/A 4/4/2016    Procedure: CERVICAL EPIDURAL;  Surgeon: Herminio Arboleda MD;  Location: [facility]    Patient withough preoperative order for iv antibiotic surgical site infection prophylaxis. N/A 5/13/2016    Procedure: CERVICAL EPIDURAL;  Surgeon: Herminio Arboleda MD;  Location: [facility]    Removal gallbladder      Removal of tonsils,12+ y/o      Special service or report      appendectomy    Special service or report      gastric bypass sx    Special service or report      back cyst    Special service or report      ganglion wrist cyst    Special service or report      resection of bladder tumor    Tonsillectomy      Upper gi endoscopy,diagnosis  4/3/08    Performed by ALIVIA ESCOBEDO at [facility] [place], [facility]         Social History     Socioeconomic History    Marital status: Legally     Number of children: 3   Occupational History    Occupation: disability   Tobacco Use    Smoking status: Never     Passive exposure: Never    Smokeless tobacco: Never   Vaping Use    Vaping status: Never Used   Substance and Sexual Activity    Alcohol use: Not Currently    Drug use: No    Sexual activity: Yes     Partners: Male   Other Topics Concern    Caffeine Concern Yes     Comment: 3-4 diet pop a day    Exercise No   Social History Narrative    .  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 First  gone. 1d- attila 34- [place] on her own and has 4 children - 13,12,10, 7 unmarried and father is in custodial for 27 more years. Single parent and on public aid and food stamps and dropped out of school to be  ; 1s-rafael-33 [place]  and lives in [place]-  to labor and delivery nurse- 3 children- 4- boy hearing loss,2,1 ; 1s- 16 gloria senior  at [place] [employer]- honor school and honors student and 3 sport athlete and wants [employer] and tore collateral ligament of right elbow ; 5 stepchildren and 3 still alive. On disability since 1996. No exercise - stretches and walks. She lives in [place] in house she bought at age 17.  She lives with her boyfriend.      Social Determinants of Health     Food Insecurity: No Food Insecurity (2/7/2023)    Received from [facility], [facility]    Food Insecurity     Currently or in the past 3 months, have you worried your food would run out before you had money to buy more?: No     In the past 12 months, have you run out of food or been unable to get more?: No    Received from [facility], [facility]    Social Connections    Received from [facility], [facility]    Housing Stability         REVIEW OF SYSTEMS:   GENERAL: decreased appetite  SKIN: no rashes or abnormal skin lesions  HEENT: See HPI  LUNGS: See HPI  CARDIOVASCULAR: denies chest pain or palpitations   GI: denies N/V/C or abdominal pain      EXAM:   /88   Pulse 77   Temp 97.3 °F (36.3 °C)   Resp 18   Ht 5' 4\" (1.626 m)   Wt 230 lb (104.3 kg)   LMP 09/01/2010   SpO2 96%   BMI 39.48 kg/m²   GENERAL: well developed, well nourished,in no apparent distress  SKIN: no rashes,no suspicious lesions  HEAD: atraumatic, normocephalic.  pos tenderness on palpation of  sinuses  EYES: conjunctiva clear, EOM intact  EARS: TM's pearly, no bulging, no retraction,no fluid, bony landmarks visible  NOSE: Nostrils patent, no nasal discharge, nasal mucosa red and inflamed   THROAT: Oral mucosa pink, moist. Posterior pharynx is not erythematous. no exudates. Tonsils 1/4.    NECK: Supple, non-tender  LUNGS: clear to auscultation bilaterally, no wheezes or rhonchi. Breathing is non labored.  CARDIO: RRR without murmur  EXTREMITIES: no cyanosis, clubbing or  edema  LYMPH:  no lymphadenopathy.        ASSESSMENT AND PLAN:   Anuradha Venegas is a 64 year old female who presents with upper respiratory symptoms that are consistent with    ASSESSMENT:   Encounter Diagnosis   Name Primary?    Upper respiratory tract infection, unspecified type Yes       PLAN:   Comfort care as described in Patient Instructions  Educated on viral vs bacterial  Educated on supportive measures: ibuprofen/tylenol, hydration, delsym for cough if needed  Educated on s/s of worsening sx and when to seek higher level of care  Follow up with pcp if not improving     Meds & Refills for this Visit:  Requested Prescriptions      No prescriptions requested or ordered in this encounter     Risks, benefits, and side effects of medication explained and discussed.    The patient indicates understanding of these issues and agrees to the plan.  The patient is asked to f/u with PCP if sx's persist or worsen.  There are no Patient Instructions on file for this visit.

## 2024-11-21 ENCOUNTER — HOSPITAL ENCOUNTER (OUTPATIENT)
Age: 64
Discharge: HOME | End: 2024-11-21
Payer: MEDICAID

## 2024-11-21 VITALS
HEART RATE: 63 BPM | OXYGEN SATURATION: 97 % | RESPIRATION RATE: 16 BRPM | TEMPERATURE: 98 F | SYSTOLIC BLOOD PRESSURE: 119 MMHG | DIASTOLIC BLOOD PRESSURE: 87 MMHG

## 2024-11-21 DIAGNOSIS — H10.31 ACUTE CONJUNCTIVITIS OF RIGHT EYE, UNSPECIFIED ACUTE CONJUNCTIVITIS TYPE: Primary | ICD-10-CM

## 2024-11-21 DIAGNOSIS — B34.9 VIRAL SYNDROME: ICD-10-CM

## 2024-11-21 PROCEDURE — 99213 OFFICE O/P EST LOW 20 MIN: CPT | Performed by: NURSE PRACTITIONER

## 2024-11-21 RX ORDER — OFLOXACIN 3 MG/ML
1 SOLUTION/ DROPS OPHTHALMIC 4 TIMES DAILY
Qty: 5 ML | Refills: 0 | Status: SHIPPED | OUTPATIENT
Start: 2024-11-21 | End: 2024-11-28

## 2024-11-21 RX ORDER — OLOPATADINE HYDROCHLORIDE 1 MG/ML
1 SOLUTION/ DROPS OPHTHALMIC 2 TIMES DAILY PRN
Qty: 5 ML | Refills: 0 | Status: SHIPPED | OUTPATIENT
Start: 2024-11-21

## 2024-11-21 NOTE — ED INITIAL ASSESSMENT (HPI)
Pt c/o sore throat and aches for a couple of days. Right eye redness and drainage for a couple of days. Declined viral testing.

## 2024-11-21 NOTE — ED PROVIDER NOTES
Patient Seen in: [facility]      History     Chief Complaint   Patient presents with    Eye Problem    Sore Throat    Body ache and/or chills     Stated Complaint: possible pink eye and sore throat    Subjective:   64-year-old female with 2 days of fatigue, headache, right eye redness, itching, discharge.  Has been taking over-the-counter generic Zyrtec.  States she has household 5 members with similar symptoms.  States her throat hurts a little bit as well, however she does not think it is strep throat.  States she did not want any other interventions, except for possible medications for her eyes.  She denies vision changes.  Denies vomiting.              Objective:     Past Medical History:    Anesthesia complication    Anxiety state, unspecified    xanax    ASTHMA    Has not needed inhaler in over a year    Asthma (HCC)    Depression    Difficult intubation    Dizziness and giddiness    unknown etiology of vertigo    Essential hypertension    Fibromyalgia    Hematuria    High blood pressure    Hx of diseases NEC     h/o parvovirus b 19    Hx of diseases NEC    optical herpes    HYPERTENSION    Migraine, unspecified, without mention of intractable migraine without mention of status migrainosus    Migraines    OBESITY    Obesity, unspecified    OSTEOPOROSIS    PONV (postoperative nausea and vomiting)    Sepsis (HCC)    Spinal stenosis    Undiagnosed cardiac murmurs    Unspecified sleep apnea    has C-Pap but doesn't use it.    Valvular disease    pt states leaking valves /no problems    Vertigo    Visual impairment    glasses              Past Surgical History:   Procedure Laterality Date    Appendectomy  1962    Cholecystectomy      Colonoscopy  2008    Colonoscopy,diagnostic  4/3/08    Performed by ALIVIA ESCOBEDO at [facility], [facility]    D & c  10/5/15    hysteroscopy D&C    Injection, w/wo contrast, dx/therapeutic substance, epidural/subarachnoid; cervical/thoracic N/A 3/11/2016    Procedure:  CERVICAL EPIDURAL;  Surgeon: Herminio Arboleda MD;  Location: [facility]    Injection, w/wo contrast, dx/therapeutic substance, epidural/subarachnoid; cervical/thoracic N/A 4/4/2016    Procedure: CERVICAL EPIDURAL;  Surgeon: Herminio Arboleda MD;  Location: [facility]    Injection, w/wo contrast, dx/therapeutic substance, epidural/subarachnoid; cervical/thoracic N/A 5/13/2016    Procedure: CERVICAL EPIDURAL;  Surgeon: Herminio Arboleda MD;  Location: [facility]    Knee replacement surgery  7/25/13    Right TKR by Lombardi    Orthopedic surg (pbp)  7/25/13    RIGHT KNEE REPLACEMENT    Other surgical history  11-15-12 [facility]    Exc recurrent lipoma on back    Other surgical history      bladder tumor    Other surgical history      gastric bypass    Other surgical history      ganglion left wrist    Other surgical history      meniscus pt cannot recall which knee    Other surgical history  06/20/14    Cystoscopy - Dr. Glez     Other surgical history  04/05/2021    Cystoscopy w/ Dr Dove    Other surgical history  06/01/2021    Cysto Stent removal Dr. Wright     Patient documented not to have experienced any of the following events N/A 3/11/2016    Procedure: CERVICAL EPIDURAL;  Surgeon: Herminio Arboleda MD;  Location: [facility]    Patient documented not to have experienced any of the following events N/A 4/4/2016    Procedure: CERVICAL EPIDURAL;  Surgeon: Herminio Arboleda MD;  Location: [facility]    Patient documented not to have experienced any of the following events N/A 5/13/2016    Procedure: CERVICAL EPIDURAL;  Surgeon: Herminio Arboleda MD;  Location: [facility]    Patient withough preoperative order for iv antibiotic surgical site infection prophylaxis. N/A 3/11/2016    Procedure: CERVICAL EPIDURAL;  Surgeon: Herminio Arboleda MD;  Location: [facility]    Patient withough  preoperative order for iv antibiotic surgical site infection prophylaxis. N/A 4/4/2016    Procedure: CERVICAL EPIDURAL;  Surgeon: Herminio Arboleda MD;  Location: [facility]    Patient withough preoperative order for iv antibiotic surgical site infection prophylaxis. N/A 5/13/2016    Procedure: CERVICAL EPIDURAL;  Surgeon: Herminio Arboleda MD;  Location: [facility]    Removal gallbladder      Removal of tonsils,12+ y/o      Special service or report      appendectomy    Special service or report      gastric bypass sx    Special service or report      back cyst    Special service or report      ganglion wrist cyst    Special service or report      resection of bladder tumor    Tonsillectomy      Upper gi endoscopy,diagnosis  4/3/08    Performed by ALIVIA ESCOBEDO at [facility] [place], [facility]                Social History     Socioeconomic History    Marital status: Legally     Number of children: 3   Occupational History    Occupation: disability   Tobacco Use    Smoking status: Never     Passive exposure: Never    Smokeless tobacco: Never   Vaping Use    Vaping status: Never Used   Substance and Sexual Activity    Alcohol use: Not Currently    Drug use: No    Sexual activity: Yes     Partners: Male   Other Topics Concern    Caffeine Concern Yes     Comment: 3-4 diet pop a day    Exercise No   Social History Narrative    .  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 First  gone. 1d- attila 34- [place] on her own and has 4 children - 13,12,10, 7 unmarried and father is in residential for 27 more years. Single parent and on public aid and food stamps and dropped out of school to be  ; 1s-rafael-33 [place]  and lives in [place]-  to labor and delivery nurse- 3 children- 4- boy hearing loss,2,1 ; 1s- 16 gloria senior at [employer]- honor school and honors student and 3  sport athlete and wants [place] and tore collateral ligament of right elbow ; 5 stepchildren and 3 still alive. On disability since 1996. No exercise - stretches and walks. She lives in [place] in house she bought at age 17.  She lives with her boyfriend.      Social Drivers of Health     Financial Resource Strain: Medium Risk (8/2/2024)    Received from [facility]    Overall Financial Resource Strain (CARDIA)     Difficulty of Paying Living Expenses: Somewhat hard   Food Insecurity: Food Insecurity Present (8/2/2024)    Received from [facility]    Hunger Vital Sign     Worried About Running Out of Food in the Last Year: Sometimes true     Ran Out of Food in the Last Year: Sometimes true   Transportation Needs: Unmet Transportation Needs (8/2/2024)    Received from [facility]    PRAPARE - Transportation     Lack of Transportation (Medical): Yes     Lack of Transportation (Non-Medical): Yes   Physical Activity: Sufficiently Active (8/2/2024)    Received from [facility]    Exercise Vital Sign     Days of Exercise per Week: 5 days     Minutes of Exercise per Session: 30 min   Stress: Stress Concern Present (8/2/2024)    Received from [facility]    [employer] [place] [place] [employer] - Occupational Stress Questionnaire     Feeling of Stress : Rather much   Social Connections: Socially Integrated (8/2/2024)    Received from [facility]    Social Connection and Isolation Panel [NHANES]     Frequency of Communication with Friends and Family: More than three times a week     Frequency of Social Gatherings with Friends and Family: More than three times a week     Attends Congregational Services: More than 4 times per year     Active Member of Clubs or Organizations: Yes     Attends Club or Organization Meetings: More than 4 times per year     Marital Status: Living with partner   Housing Stability: Low Risk  (8/2/2024)    Received from [facility]    Housing  Stability Vital Sign     Unable to Pay for Housing in the Last Year: No     Number of Times Moved in the Last Year: 1     Homeless in the Last Year: No              Review of Systems   Constitutional:  Positive for fatigue. Negative for fever.   HENT:  Positive for sore throat.    Eyes:  Positive for discharge, redness and itching. Negative for photophobia and visual disturbance.   Neurological:  Positive for headaches.   All other systems reviewed and are negative.      Positive for stated complaint: possible pink eye and sore throat  Other systems are as noted in HPI.  Constitutional and vital signs reviewed.      All other systems reviewed and negative except as noted above.    Physical Exam     ED Triage Vitals [11/21/24 0910]   /87   Pulse 63   Resp 16   Temp 97.8 °F (36.6 °C)   Temp src    SpO2 97 %   O2 Device        Current Vitals:   Vital Signs  BP: 119/87  Pulse: 63  Resp: 16  Temp: 97.8 °F (36.6 °C)    Oxygen Therapy  SpO2: 97 %        Physical Exam  Vitals and nursing note reviewed.   Constitutional:       General: She is not in acute distress.     Appearance: She is not ill-appearing, toxic-appearing or diaphoretic.   HENT:      Head:      Jaw: No trismus.      Right Ear: Tympanic membrane, ear canal and external ear normal.      Left Ear: Tympanic membrane, ear canal and external ear normal.      Mouth/Throat:      Lips: Pink. No lesions.      Mouth: Mucous membranes are moist. No oral lesions or angioedema.      Tongue: No lesions.      Palate: No lesions.      Pharynx: Oropharynx is clear. Uvula midline. Posterior oropharyngeal erythema present. No pharyngeal swelling, oropharyngeal exudate or uvula swelling.   Eyes:      General: Vision grossly intact. No allergic shiner or visual field deficit.     Extraocular Movements: Extraocular movements intact.      Conjunctiva/sclera:      Right eye: Right conjunctiva is injected. No chemosis, exudate or hemorrhage.  Cardiovascular:      Rate and  Rhythm: Normal rate and regular rhythm.      Pulses: Normal pulses.      Heart sounds: Normal heart sounds.   Pulmonary:      Effort: Pulmonary effort is normal. No respiratory distress.      Breath sounds: Normal breath sounds. No stridor. No wheezing, rhonchi or rales.   Musculoskeletal:      Cervical back: Normal range of motion and neck supple.   Skin:     General: Skin is warm and dry.      Capillary Refill: Capillary refill takes less than 2 seconds.      Coloration: Skin is not jaundiced or pale.   Neurological:      General: No focal deficit present.      Mental Status: She is alert and oriented to person, place, and time.   Psychiatric:         Mood and Affect: Mood normal.         Behavior: Behavior normal.             ED Course   Labs Reviewed - No data to display                MDM              Medical Decision Making  Nontoxic-appearing 64-year-old female in no respiratory distress with fatigue, headache, sore throat.There is no trismus, drooling, voice change/muffled voice, so I do not think this is epiglottitis.  She did not want strep swab.  She also did not want any COVID or flu swabs.  Her main concern is to get the eye treatment as she thinks she has a virus.  She does have an injected right eye, and reported eye itching and discharge.  No vision changes.  I will empiric treat her with ofloxacin eyedrops.  Pataday eyedrops as needed for itching.  Already taking allergy medications.    Supportive/home management of diagnosis/illness/injury discussed. Red flag symptoms discussed.  Signs and symptoms/criteria that would necessitate reevaluation, including ER evaluation discussed.  Patient and/or responsible adult verbalize and agree with management and plan of care.    Speech recognition software was used during this dictation.  There may be minor errors in transcription.        Risk  Prescription drug management.        Disposition and Plan     Clinical Impression:  1. Acute conjunctivitis of right  eye, unspecified acute conjunctivitis type    2. Viral syndrome         Disposition:  Discharge  11/21/2024  9:33 am    Follow-up:  Kriss Josue MD  [address] [id number] [phone]    Call in 3 days            Medications Prescribed:  Current Discharge Medication List        START taking these medications    Details   ofloxacin 0.3 % Ophthalmic Solution Place 1 drop into both eyes 4 (four) times daily for 7 days.  Qty: 5 mL, Refills: 0      olopatadine (PATADAY) 0.1 % Ophthalmic Solution Apply 1 drop to eye 2 (two) times daily as needed (eye itching).  Qty: 5 mL, Refills: 0                 Supplementary Documentation:

## 2025-02-01 ENCOUNTER — HOSPITAL ENCOUNTER (OUTPATIENT)
Age: 65
Discharge: HOME | End: 2025-02-01
Payer: MEDICARE

## 2025-02-01 VITALS
TEMPERATURE: 98 F | HEART RATE: 72 BPM | SYSTOLIC BLOOD PRESSURE: 135 MMHG | DIASTOLIC BLOOD PRESSURE: 89 MMHG | BODY MASS INDEX: 37.56 KG/M2 | RESPIRATION RATE: 16 BRPM | HEIGHT: 64 IN | OXYGEN SATURATION: 99 % | WEIGHT: 220 LBS

## 2025-02-01 DIAGNOSIS — J10.1 INFLUENZA A: Primary | ICD-10-CM

## 2025-02-01 DIAGNOSIS — R05.1 ACUTE COUGH: ICD-10-CM

## 2025-02-01 LAB
POCT INFLUENZA A: POSITIVE
POCT INFLUENZA B: NEGATIVE

## 2025-02-01 NOTE — ED INITIAL ASSESSMENT (HPI)
Pt woke up in the middle of the night 2 nights ago coughing and feeling aches. Slept all day yesterday. Concerned she aspirated. Has hx of aspiration pneumonia.

## 2025-02-01 NOTE — DISCHARGE INSTRUCTIONS
Rest and drink plenty of fluids.    This will help to thin the mucous in the back of your throat.   Take Tylenol and/or ibuprofen as needed for pain or fever.   Use Zyrtec, Claritin, or Allegra to help with nasal drainage.   You can take this twice a day.   Use Flonase nasal spray one spray each nostril twice a day.   You can also take Sudafed to help with sinus pressure or pain.   Get the medication behind the pharmacy counter.   Take Robitussin or Delsym as needed for cough.   Follow up with your PCP in 5-7 days.     Your symptoms should improve in the next 7-10 days; however, the cough can linger for much longer.     Thank you for choosing [facility] for your care.

## 2025-02-01 NOTE — ED PROVIDER NOTES
Patient Seen in: [facility]      History     Chief Complaint   Patient presents with    Cough/URI    Body ache and/or chills     Stated Complaint: Cough    Subjective:   65-year-old female presents to the immediate care with complaint of cough.  Patient states she started Thursday not feeling well with fatigue, nasal congestion, and cough.  She states she spent most of yesterday in bed sleeping.  She has had some bodyaches and headaches with this as well.  She is unsure if she has had a fever, but did have some chills.  She feels a little bit better today.  She is concerned she may have aspiration pneumonia as she has had this in the past.  She denies ear pain, ear drainage, sore throat, difficulty swallowing, voice changes, shortness of breath, or chest pain.    The history is provided by the patient.         Objective:     Past Medical History:    Anesthesia complication    Anxiety state, unspecified    xanax    ASTHMA    Has not needed inhaler in over a year    Asthma (HCC)    Depression    Difficult intubation    Dizziness and giddiness    unknown etiology of vertigo    Essential hypertension    Fibromyalgia    Hematuria    High blood pressure    Hx of diseases NEC     h/o parvovirus b 19    Hx of diseases NEC    optical herpes    HYPERTENSION    Migraine, unspecified, without mention of intractable migraine without mention of status migrainosus    Migraines    OBESITY    Obesity, unspecified    OSTEOPOROSIS    PONV (postoperative nausea and vomiting)    Sepsis (HCC)    Spinal stenosis    Undiagnosed cardiac murmurs    Unspecified sleep apnea    has C-Pap but doesn't use it.    Valvular disease    pt states leaking valves /no problems    Vertigo    Visual impairment    glasses              Past Surgical History:   Procedure Laterality Date    Appendectomy  1962    Cholecystectomy      Colonoscopy  2008    Colonoscopy,diagnostic  4/3/08    Performed by ALIVIA ESCOBEDO at [facility], [facility]    EDE  & c  10/5/15    hysteroscopy D&C    Injection, w/wo contrast, dx/therapeutic substance, epidural/subarachnoid; cervical/thoracic N/A 3/11/2016    Procedure: CERVICAL EPIDURAL;  Surgeon: Herminio Arboleda MD;  Location: [facility]    Injection, w/wo contrast, dx/therapeutic substance, epidural/subarachnoid; cervical/thoracic N/A 4/4/2016    Procedure: CERVICAL EPIDURAL;  Surgeon: Herminio Arboleda MD;  Location: [facility]    Injection, w/wo contrast, dx/therapeutic substance, epidural/subarachnoid; cervical/thoracic N/A 5/13/2016    Procedure: CERVICAL EPIDURAL;  Surgeon: Hemrinio Arboleda MD;  Location: [facility]    Knee replacement surgery  7/25/13    Right TKR by Lombardi    Orthopedic surg ([facility])  7/25/13    RIGHT KNEE REPLACEMENT    Other surgical history  11-15-12 [facility]    Exc recurrent lipoma on back    Other surgical history      bladder tumor    Other surgical history      gastric bypass    Other surgical history      ganglion left wrist    Other surgical history      meniscus pt cannot recall which knee    Other surgical history  06/20/14    Cystoscopy - Dr. Glez     Other surgical history  04/05/2021    Cystoscopy w/ Dr Dove    Other surgical history  06/01/2021    Cysto Stent removal Dr. Wright     Patient documented not to have experienced any of the following events N/A 3/11/2016    Procedure: CERVICAL EPIDURAL;  Surgeon: Herminio Arboleda MD;  Location: [facility]    Patient documented not to have experienced any of the following events N/A 4/4/2016    Procedure: CERVICAL EPIDURAL;  Surgeon: Herminio Arboleda MD;  Location: [facility]    Patient documented not to have experienced any of the following events N/A 5/13/2016    Procedure: CERVICAL EPIDURAL;  Surgeon: Herminio Arboleda MD;  Location: [facility]    Patient withough preoperative order for iv antibiotic surgical site  infection prophylaxis. N/A 3/11/2016    Procedure: CERVICAL EPIDURAL;  Surgeon: Herminio Arboleda MD;  Location: [facility]    Patient withough preoperative order for iv antibiotic surgical site infection prophylaxis. N/A 4/4/2016    Procedure: CERVICAL EPIDURAL;  Surgeon: Herminio Arboleda MD;  Location: [facility]    Patient withough preoperative order for iv antibiotic surgical site infection prophylaxis. N/A 5/13/2016    Procedure: CERVICAL EPIDURAL;  Surgeon: Herminio Arboleda MD;  Location: [facility]    Removal gallbladder      Removal of tonsils,12+ y/o      Special service or report      appendectomy    Special service or report      gastric bypass sx    Special service or report      back cyst    Special service or report      ganglion wrist cyst    Special service or report      resection of bladder tumor    Tonsillectomy      Upper gi endoscopy,diagnosis  4/3/08    Performed by ALIVIA ESCOBEDO at [facility] [place], [facility]                Social History     Socioeconomic History    Marital status: Legally     Number of children: 3   Occupational History    Occupation: disability   Tobacco Use    Smoking status: Never     Passive exposure: Never    Smokeless tobacco: Never   Vaping Use    Vaping status: Never Used   Substance and Sexual Activity    Alcohol use: Not Currently    Drug use: No    Sexual activity: Yes     Partners: Male   Other Topics Concern    Caffeine Concern Yes     Comment: 3-4 diet pop a day    Exercise No   Social History Narrative    .  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 First  gone. 1d- attila 34- plano on her own and has 4 children - 13,12,10, 7 unmarried and father is in intermediate for 27 more years. Single parent and on public aid and food stamps and dropped out of school to be  ; 1s-rafael-33 [place]  and lives in  [place]-  to labor and delivery nurse- 3 children- 4- boy hearing loss,2,1 ; 1s- 16 gloria senior at [employer]- honor school and honors student and 3 sport athlete and wants [place] and tore collateral ligament of right elbow ; 5 stepchildren and 3 still alive. On disability since 1996. No exercise - stretches and walks. She lives in [place] in house she bought at age 17.  She lives with her boyfriend.      Social Drivers of Health     Financial Resource Strain: Medium Risk (8/2/2024)    Received from [facility]    Overall Financial Resource Strain (CARDIA)     Difficulty of Paying Living Expenses: Somewhat hard   Food Insecurity: Food Insecurity Present (8/2/2024)    Received from [facility]    Hunger Vital Sign     Worried About Running Out of Food in the Last Year: Sometimes true     Ran Out of Food in the Last Year: Sometimes true   Transportation Needs: Unmet Transportation Needs (8/2/2024)    Received from [facility]    PRAPARE - Transportation     Lack of Transportation (Medical): Yes     Lack of Transportation (Non-Medical): Yes   Physical Activity: Sufficiently Active (8/2/2024)    Received from [facility]    Exercise Vital Sign     Days of Exercise per Week: 5 days     Minutes of Exercise per Session: 30 min   Stress: Stress Concern Present (8/2/2024)    Received from [facility]    [employer] [place] [employer] - Occupational Stress Questionnaire     Feeling of Stress : Rather much   Social Connections: Socially Integrated (8/2/2024)    Received from [facility]    Social Connection and Isolation Panel [NHANES]     Frequency of Communication with Friends and Family: More than three times a week     Frequency of Social Gatherings with Friends and Family: More than three times a week     Attends Restorationism Services: More than 4 times per year     Active Member of Clubs or Organizations: Yes     Attends Club or Organization  Meetings: More than 4 times per year     Marital Status: Living with partner   Housing Stability: Low Risk  (8/2/2024)    Received from [facility] Vital Sign     Unable to Pay for Housing in the Last Year: No     Number of Times Moved in the Last Year: 1     Homeless in the Last Year: No              Review of Systems   Constitutional:  Positive for chills and fatigue. Negative for fever.   HENT:  Positive for congestion. Negative for ear discharge, ear pain, rhinorrhea, sore throat, trouble swallowing and voice change.    Respiratory:  Positive for cough. Negative for chest tightness, shortness of breath and wheezing.    Cardiovascular: Negative.  Negative for chest pain.   Musculoskeletal:  Positive for myalgias.   Neurological:  Positive for headaches. Negative for dizziness and light-headedness.   All other systems reviewed and are negative.      Positive for stated complaint: Cough  Other systems are as noted in HPI.  Constitutional and vital signs reviewed.      All other systems reviewed and negative except as noted above.    Physical Exam     ED Triage Vitals [02/01/25 1040]   /89   Pulse 72   Resp 16   Temp 97.7 °F (36.5 °C)   Temp src Oral   SpO2 99 %   O2 Device None (Room air)       Current Vitals:   Vital Signs  BP: 135/89  Pulse: 72  Resp: 16  Temp: 97.7 °F (36.5 °C)  Temp src: Oral    Oxygen Therapy  SpO2: 99 %  O2 Device: None (Room air)        Physical Exam  Vitals and nursing note reviewed.   Constitutional:       General: She is not in acute distress.     Appearance: Normal appearance. She is obese. She is not ill-appearing.   HENT:      Head: Normocephalic and atraumatic.      Right Ear: Tympanic membrane, ear canal and external ear normal.      Left Ear: Tympanic membrane, ear canal and external ear normal.      Nose: Congestion present.      Mouth/Throat:      Mouth: Mucous membranes are moist.      Pharynx: Oropharynx is clear.   Eyes:       Conjunctiva/sclera: Conjunctivae normal.      Pupils: Pupils are equal, round, and reactive to light.   Cardiovascular:      Rate and Rhythm: Normal rate and regular rhythm.      Pulses: Normal pulses.      Heart sounds: Normal heart sounds.   Pulmonary:      Effort: Pulmonary effort is normal. No respiratory distress.      Breath sounds: Normal breath sounds.   Musculoskeletal:         General: Normal range of motion.   Skin:     General: Skin is warm and dry.   Neurological:      General: No focal deficit present.      Mental Status: She is alert and oriented to person, place, and time.   Psychiatric:         Mood and Affect: Mood normal.         Behavior: Behavior normal.           ED Course     Labs Reviewed   POCT FLU TEST - Abnormal; Notable for the following components:       Result Value    POCT INFLUENZA A Positive (*)     All other components within normal limits    Narrative:     This assay is a rapid molecular in vitro test utilizing nucleic acid amplification of influenza A and B viral RNA.       ED Course as of 02/01/25 1126  ------------------------------------------------------------  Time: 02/01 1117  Value: POCT Flu Test(!)  Comment: Positive for influenza A.  Negative for influenza B.             [facility]        Medical Decision Making  65-year-old female with fatigue, body aches, and cough.  Rapid flu was ordered.  Patient is positive for influenza A.  Negative for influenza B.  Did discuss with patient that she is within the window for Tamiflu.  However after going over possible side effects of the medication patient is decided she does not want this medication.  Hx and exam are consistent with a viral infection.  No indication for any other labs, imaging, or abx.  No evidence of sepsis, strep, otitis, pneumonia, bacterial sinusitis, or other bacterial etiology.  Counseled patient on supportive management at home.  F/u with PCP in a few days.  Verbalized understanding and agreement.    Amount and/or  Complexity of Data Reviewed  Labs: ordered. Decision-making details documented in ED Course.    Risk  OTC drugs.        Disposition and Plan     Clinical Impression:  1. Influenza A    2. Acute cough         Disposition:  Discharge  2/1/2025 11:23 am    Follow-up:  Kriss Josue MD  [address] [address]  [phone]    In 1 week  As needed          Medications Prescribed:  Discharge Medication List as of 2/1/2025 11:25 AM              Supplementary Documentation:

## 2025-03-26 ENCOUNTER — HOSPITAL ENCOUNTER (OUTPATIENT)
Age: 65
Discharge: HOME | End: 2025-03-26
Payer: MEDICARE

## 2025-03-26 VITALS
TEMPERATURE: 98 F | SYSTOLIC BLOOD PRESSURE: 138 MMHG | HEART RATE: 71 BPM | DIASTOLIC BLOOD PRESSURE: 94 MMHG | RESPIRATION RATE: 16 BRPM | OXYGEN SATURATION: 97 %

## 2025-03-26 DIAGNOSIS — U07.1 COVID-19: Primary | ICD-10-CM

## 2025-03-26 LAB
POCT INFLUENZA A: NEGATIVE
POCT INFLUENZA B: NEGATIVE
SARS-COV-2 RNA RESP QL NAA+PROBE: DETECTED

## 2025-03-26 PROCEDURE — 87502 INFLUENZA DNA AMP PROBE: CPT | Performed by: NURSE PRACTITIONER

## 2025-03-26 PROCEDURE — U0002 COVID-19 LAB TEST NON-CDC: HCPCS | Performed by: NURSE PRACTITIONER

## 2025-03-26 PROCEDURE — 99214 OFFICE O/P EST MOD 30 MIN: CPT | Performed by: NURSE PRACTITIONER

## 2025-03-26 RX ORDER — ALBUTEROL SULFATE 90 UG/1
2 INHALANT RESPIRATORY (INHALATION) EVERY 4 HOURS PRN
Qty: 1 EACH | Refills: 0 | Status: SHIPPED | OUTPATIENT
Start: 2025-03-26 | End: 2025-04-25

## 2025-03-26 NOTE — ED PROVIDER NOTES
Patient Seen in: [facility]      History     Chief Complaint   Patient presents with    Cough/URI    Body ache and/or chills     Stated Complaint: Flu symptoms    Subjective:   65-year-old female presents today with flulike symptoms.  Does have history of asthma feeling short of breath and reports wheezing.  Has been using her rescue inhaler but is ran out.  Denies any nausea vomiting diarrhea.  No recent known exposure to illness.  Patient is alert oriented x 3.  No other symptoms or concerns.  The patient's medication list, past medical history and social history elements as listed in today's nurse's notes were reviewed and agreed (except as otherwise stated in the HPI).  The patient's family history reviewed and determined to be noncontributory to the presenting problem              Objective:     Past Medical History:    Anesthesia complication    Anxiety state, unspecified    xanax    ASTHMA    Has not needed inhaler in over a year    Asthma (HCC)    Depression    Difficult intubation    Dizziness and giddiness    unknown etiology of vertigo    Essential hypertension    Fibromyalgia    Hematuria    High blood pressure    Hx of diseases NEC     h/o parvovirus b 19    Hx of diseases NEC    optical herpes    HYPERTENSION    Migraine, unspecified, without mention of intractable migraine without mention of status migrainosus    Migraines    OBESITY    Obesity, unspecified    OSTEOPOROSIS    PONV (postoperative nausea and vomiting)    Sepsis (HCC)    Spinal stenosis    Undiagnosed cardiac murmurs    Unspecified sleep apnea    has C-Pap but doesn't use it.    Valvular disease    pt states leaking valves /no problems    Vertigo    Visual impairment    glasses              Past Surgical History:   Procedure Laterality Date    Appendectomy  1962    Cholecystectomy      Colonoscopy  2008    Colonoscopy,diagnostic  4/3/08    Performed by ALIVIA ESCOBEDO at [facility] [place], [facility]    D & c  10/5/15     hysteroscopy D&C    Injection, w/wo contrast, dx/therapeutic substance, epidural/subarachnoid; cervical/thoracic N/A 3/11/2016    Procedure: CERVICAL EPIDURAL;  Surgeon: Herminio Arboleda MD;  Location: [facility]    Injection, w/wo contrast, dx/therapeutic substance, epidural/subarachnoid; cervical/thoracic N/A 4/4/2016    Procedure: CERVICAL EPIDURAL;  Surgeon: Herminio Arboleda MD;  Location: [facility]    Injection, w/wo contrast, dx/therapeutic substance, epidural/subarachnoid; cervical/thoracic N/A 5/13/2016    Procedure: CERVICAL EPIDURAL;  Surgeon: Herminio Arboleda MD;  Location: [facility]    Knee replacement surgery  7/25/13    Right TKR by Lombardi    Orthopedic surg ([facility])  7/25/13    RIGHT KNEE REPLACEMENT    Other surgical history  11-15-12 [facility]    Exc recurrent lipoma on back    Other surgical history      bladder tumor    Other surgical history      gastric bypass    Other surgical history      ganglion left wrist    Other surgical history      meniscus pt cannot recall which knee    Other surgical history  06/20/14    Cystoscopy - Dr. Glez     Other surgical history  04/05/2021    Cystoscopy w/ Dr Dove    Other surgical history  06/01/2021    Cysto Stent removal Dr. Wright     Patient documented not to have experienced any of the following events N/A 3/11/2016    Procedure: CERVICAL EPIDURAL;  Surgeon: Herminio Arboleda MD;  Location: [facility]    Patient documented not to have experienced any of the following events N/A 4/4/2016    Procedure: CERVICAL EPIDURAL;  Surgeon: Herminio Arboleda MD;  Location: [facility]    Patient documented not to have experienced any of the following events N/A 5/13/2016    Procedure: CERVICAL EPIDURAL;  Surgeon: Herminio Arboleda MD;  Location: [facility]    Patient withough preoperative order for iv antibiotic surgical site infection prophylaxis.  N/A 3/11/2016    Procedure: CERVICAL EPIDURAL;  Surgeon: Herminio Arboleda MD;  Location: [facility]    Patient withough preoperative order for iv antibiotic surgical site infection prophylaxis. N/A 4/4/2016    Procedure: CERVICAL EPIDURAL;  Surgeon: Herminio Arboleda MD;  Location: [facility]    Patient withough preoperative order for iv antibiotic surgical site infection prophylaxis. N/A 5/13/2016    Procedure: CERVICAL EPIDURAL;  Surgeon: Herminio Arboleda MD;  Location: [facility]    Removal gallbladder      Removal of tonsils,12+ y/o      Special service or report      appendectomy    Special service or report      gastric bypass sx    Special service or report      back cyst    Special service or report      ganglion wrist cyst    Special service or report      resection of bladder tumor    Tonsillectomy      Upper gi endoscopy,diagnosis  4/3/08    Performed by ALIVIA ESCOBEDO at [facility] [place], [facility]                Social History     Socioeconomic History    Marital status: Legally     Number of children: 3   Occupational History    Occupation: disability   Tobacco Use    Smoking status: Never     Passive exposure: Never    Smokeless tobacco: Never   Vaping Use    Vaping status: Never Used   Substance and Sexual Activity    Alcohol use: Not Currently    Drug use: No    Sexual activity: Yes     Partners: Male   Other Topics Concern    Caffeine Concern Yes     Comment: 3-4 diet pop a day    Exercise No   Social History Narrative    .  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 First  gone. 1d- attila 34- plano on her own and has 4 children - 13,12,10, 7 unmarried and father is in group home for 27 more years. Single parent and on public aid and food stamps and dropped out of school to be  ; 1s-rafael-33 [place]  and lives in [place]-  to  labor and delivery nurse- 3 children- 4- boy hearing loss,2,1 ; 1s- 16 gloria senior at [employer]- honor school and honors student and 3 sport athlete and wants [place] and tore collateral ligament of right elbow ; 5 stepchildren and 3 still alive. On disability since 1996. No exercise - stretches and walks. She lives in [place] in house she bought at age 17.  She lives with her boyfriend.      Social Drivers of Health     Food Insecurity: Food Insecurity Present (8/2/2024)    Received from [facility]    Hunger Vital Sign     Worried About Running Out of Food in the Last Year: Sometimes true     Ran Out of Food in the Last Year: Sometimes true   Transportation Needs: Unmet Transportation Needs (8/2/2024)    Received from [facility]    PRAPARE - Transportation     Lack of Transportation (Medical): Yes     Lack of Transportation (Non-Medical): Yes   Housing Stability: Low Risk  (8/2/2024)    Received from [facility]    Housing Stability Vital Sign     Unable to Pay for Housing in the Last Year: No     Number of Times Moved in the Last Year: 1     Homeless in the Last Year: No              Review of Systems    Positive for stated complaint: Flu symptoms  Other systems are as noted in HPI.  Constitutional and vital signs reviewed.      All other systems reviewed and negative except as noted above.    Physical Exam     ED Triage Vitals [03/26/25 0949]   BP (!) 138/94   Pulse 71   Resp 16   Temp 98 °F (36.7 °C)   Temp src Oral   SpO2 97 %   O2 Device        Current Vitals:   Vital Signs  BP: (!) 138/94  Pulse: 71  Resp: 16  Temp: 98 °F (36.7 °C)  Temp src: Oral    Oxygen Therapy  SpO2: 97 %        Physical Exam  Vitals and nursing note reviewed.   Constitutional:       Appearance: Normal appearance.   HENT:      Head: Normocephalic.      Right Ear: Tympanic membrane normal.      Left Ear: Tympanic membrane normal.      Nose: Congestion and rhinorrhea present.   Eyes:      Pupils: Pupils are  equal, round, and reactive to light.   Cardiovascular:      Rate and Rhythm: Normal rate.   Pulmonary:      Effort: Pulmonary effort is normal.      Breath sounds: Rhonchi present.   Musculoskeletal:      Cervical back: Normal range of motion and neck supple.   Skin:     General: Skin is warm and dry.   Neurological:      Mental Status: She is alert and oriented to person, place, and time.             ED Course     Labs Reviewed   RAPID SARS-COV-2 BY PCR - Abnormal; Notable for the following components:       Result Value    Rapid SARS-CoV-2 by PCR Detected (*)     All other components within normal limits   POCT FLU TEST - Normal    Narrative:     This assay is a rapid molecular in vitro test utilizing nucleic acid amplification of influenza A and B viral RNA.                   MDM     Please note that this report has been produced using speech recognition software and may contain errors related to that system including, but not limited to, errors in grammar, punctuation, and spelling, as well as words and phrases that possibly may have been recognized inappropriately.  If there are any questions or concerns, contact the dictating provider for clarification.              Medical Decision Making  Differential diagnosis includes but is not limited to: COVID-19, viral URI, strep throat, influenza, pneumonia, sinusitis, bronchitis      Presented today with URI symptoms with cough.  Rapid flu was done and negative.  COVID-19 testing was done and was positive.  Patient was offered Paxlovid but declines.  Will refill patient's albuterol inhaler, spacer was given here in the office.  Also given prescription for pulse oximeter to use at home as needed.  To remain quarantined symptoms started to improve, if anytime develops a fever must be fever free for 24 hours with symptoms improving before leaving quarantine.  Encouraged to push fluids rest.  To take Tylenol for any fever pain.  Take over-the-counter antihistamine cough  suppressant as needed.   To follow-up with primary MD 14 days after quarantine ends for reevaluation.  Patient verbalized understanding agree with plan of care.      Amount and/or Complexity of Data Reviewed  Labs: ordered. Decision-making details documented in ED Course.     Details: COVID-19  Influenza    Risk  OTC drugs.  Prescription drug management.        Disposition and Plan     Clinical Impression:  1. COVID-19         Disposition:  Discharge  3/26/2025 10:19 am    Follow-up:  Kriss Josue MD  [address] [address]  [phone]    In 1 week  As needed          Medications Prescribed:  Current Discharge Medication List        START taking these medications    Details   !! albuterol 108 (90 Base) MCG/ACT Inhalation Aero Soln Inhale 2 puffs into the lungs every 4 (four) hours as needed for Wheezing.  Qty: 1 each, Refills: 0      Misc. Devices (PULSE OXIMETER FOR FINGER) Does not apply Misc 1 each as needed.  Qty: 1 each, Refills: 0       !! - Potential duplicate medications found. Please discuss with provider.              Supplementary Documentation:

## 2025-03-26 NOTE — DISCHARGE INSTRUCTIONS
Take Coricidin for congestion runny nose and cough.  Alternate Tylenol and Motrin for any fever pain.  Use your inhaler as needed.  Check your oxygen level if feeling short of breath.  If your oxygen saturation is less than 90% after using your inhaler or still feeling short of breath go to the ER.

## 2025-04-04 ENCOUNTER — HOSPITAL ENCOUNTER (OUTPATIENT)
Age: 65
Discharge: HOME | End: 2025-04-04
Payer: MEDICARE

## 2025-04-04 ENCOUNTER — APPOINTMENT (OUTPATIENT)
Dept: GENERAL RADIOLOGY | Age: 65
End: 2025-04-04
Attending: NURSE PRACTITIONER
Payer: MEDICARE

## 2025-04-04 VITALS
TEMPERATURE: 97 F | SYSTOLIC BLOOD PRESSURE: 149 MMHG | OXYGEN SATURATION: 99 % | WEIGHT: 220 LBS | DIASTOLIC BLOOD PRESSURE: 83 MMHG | RESPIRATION RATE: 18 BRPM | HEART RATE: 74 BPM | BODY MASS INDEX: 38 KG/M2

## 2025-04-04 DIAGNOSIS — B37.2 CANDIDOSIS OF SKIN: ICD-10-CM

## 2025-04-04 DIAGNOSIS — R05.1 ACUTE COUGH: Primary | ICD-10-CM

## 2025-04-04 DIAGNOSIS — H57.11 PAIN OF RIGHT EYE: ICD-10-CM

## 2025-04-04 PROCEDURE — 71046 X-RAY EXAM CHEST 2 VIEWS: CPT | Performed by: NURSE PRACTITIONER

## 2025-04-04 PROCEDURE — 99214 OFFICE O/P EST MOD 30 MIN: CPT | Performed by: NURSE PRACTITIONER

## 2025-04-04 RX ORDER — PREDNISONE 20 MG/1
40 TABLET ORAL DAILY
Qty: 10 TABLET | Refills: 0 | Status: SHIPPED | OUTPATIENT
Start: 2025-04-04 | End: 2025-04-09

## 2025-04-04 RX ORDER — CLOTRIMAZOLE 1 %
1 CREAM (GRAM) TOPICAL 2 TIMES DAILY
Qty: 85 G | Refills: 0 | Status: SHIPPED | OUTPATIENT
Start: 2025-04-04 | End: 2025-04-18

## 2025-04-04 RX ORDER — VALACYCLOVIR HYDROCHLORIDE 500 MG/1
500 TABLET, FILM COATED ORAL 2 TIMES DAILY
Qty: 6 TABLET | Refills: 0 | Status: SHIPPED | OUTPATIENT
Start: 2025-04-04 | End: 2025-04-07

## 2025-04-04 NOTE — ED PROVIDER NOTES
Patient Seen in: [facility]      History     Chief Complaint   Patient presents with    Eye Problem    Cough     Stated Complaint: had covid/eyes sore still has symptoms    Subjective:   65-year-old female presents today with ongoing COVID-like symptoms for the last 10 to 12 days.  Still has feelings of fatigue with worsening cough.  She is now cough is productive.  Patient also reports rash under the breast consistent with yeast infection per patient.  Patient reports having pain to the right eye without discharge.  States does have a history of shingles in the eye in the past.  No change in vision or photosensitivity.  No obvious lesions around the eye.  Denies any nausea vomiting diarrhea.  Patient is alert oriented x 3.  No symptoms or concerns.  The patient's medication list, past medical history and social history elements as listed in today's nurse's notes were reviewed and agreed (except as otherwise stated in the HPI).  The patient's family history reviewed and determined to be noncontributory to the presenting problem              Objective:     Past Medical History:    Anesthesia complication    Anxiety state, unspecified    xanax    ASTHMA    Has not needed inhaler in over a year    Asthma (HCC)    Depression    Difficult intubation    Dizziness and giddiness    unknown etiology of vertigo    Essential hypertension    Fibromyalgia    Hematuria    High blood pressure    Hx of diseases NEC     h/o parvovirus b 19    Hx of diseases NEC    optical herpes    HYPERTENSION    Migraine, unspecified, without mention of intractable migraine without mention of status migrainosus    Migraines    OBESITY    Obesity, unspecified    OSTEOPOROSIS    PONV (postoperative nausea and vomiting)    Sepsis (HCC)    Spinal stenosis    Undiagnosed cardiac murmurs    Unspecified sleep apnea    has C-Pap but doesn't use it.    Valvular disease    pt states leaking valves /no problems    Vertigo    Visual impairment     glasses              Past Surgical History:   Procedure Laterality Date    Appendectomy  1962    Cholecystectomy      Colonoscopy  2008    Colonoscopy,diagnostic  4/3/08    Performed by ALIVIA ESCOBEDO at [facility]    D & c  10/5/15    hysteroscopy D&C    Injection, w/wo contrast, dx/therapeutic substance, epidural/subarachnoid; cervical/thoracic N/A 3/11/2016    Procedure: CERVICAL EPIDURAL;  Surgeon: Herminio Arboleda MD;  Location: [facility] PAIN MANAGEMENT    Injection, w/wo contrast, dx/therapeutic substance, epidural/subarachnoid; cervical/thoracic N/A 4/4/2016    Procedure: CERVICAL EPIDURAL;  Surgeon: Herminio Arboleda MD;  Location: [facility] PAIN MANAGEMENT    Injection, w/wo contrast, dx/therapeutic substance, epidural/subarachnoid; cervical/thoracic N/A 5/13/2016    Procedure: CERVICAL EPIDURAL;  Surgeon: Herminio Arboleda MD;  Location: [facility] PAIN [facility]    Knee replacement surgery  7/25/13    Right TKR by Lombardi    Orthopedic surg ([facility])  7/25/13    RIGHT KNEE REPLACEMENT    Other surgical history  11-15-12 [facility]    Exc recurrent lipoma on back    Other surgical history      bladder tumor    Other surgical history      gastric bypass    Other surgical history      ganglion left wrist    Other surgical history      meniscus pt cannot recall which knee    Other surgical history  06/20/14    Cystoscopy - Dr. Glez     Other surgical history  04/05/2021    Cystoscopy w/ Dr Dove    Other surgical history  06/01/2021    Cysto Stent removal Dr. Wright     Patient documented not to have experienced any of the following events N/A 3/11/2016    Procedure: CERVICAL EPIDURAL;  Surgeon: Herminio Arboleda MD;  Location: [facility] FOR PAIN MANAGEMENT    Patient documented not to have experienced any of the following events N/A 4/4/2016    Procedure: CERVICAL EPIDURAL;  Surgeon: Herminio Arboleda MD;  Location: [facility] PAIN MANAGEMENT    Patient documented not to have  experienced any of the following events N/A 5/13/2016    Procedure: CERVICAL EPIDURAL;  Surgeon: Herminio Arboleda MD;  Location: [facility] FOR PAIN MANA[facility]EMENT    Patient withough preoperative order for iv antibiotic surgical site infection prophylaxis. N/A 3/11/2016    Procedure: CERVICAL EPIDURAL;  Surgeon: Herminio Arboleda MD;  Location: [facility] FOR PAIN MANAGEMENT    Patient withough preoperative order for iv antibiotic surgical site infection prophylaxis. N/A 4/4/2016    Procedure: CERVICAL EPIDURAL;  Surgeon: Herminio Arboleda MD;  Location: [facility] FOR PAIN MANAGEMENT    Patient withough preoperative order for iv antibiotic surgical site infection prophylaxis. N/A 5/13/2016    Procedure: CERVICAL EPIDURAL;  Surgeon: Herminio Arboleda MD;  Location: [facility] FOR PAIN MANAGEMENT    Removal gallbladder      Removal of tonsils,12+ y/o      Special service or report      appendectomy    Special service or report      gastric bypass sx    Special service or report      back cyst    Special service or report      ganglion wrist cyst    Special service or report      resection of bladder tumor    Tonsillectomy      Upper gi endoscopy,diagnosis  4/3/08    Performed by ALIVIA ESCOBEDO at [facility]                No pertinent social history.            Review of Systems    Positive for stated complaint: had covid/eyes sore still has symptoms  Other systems are as noted in HPI.  Constitutional and vital signs reviewed.      All other systems reviewed and negative except as noted above.    Physical Exam     ED Triage Vitals [04/04/25 1012]   /83   Pulse 74   Resp 18   Temp 97.4 °F (36.3 °C)   Temp src Oral   SpO2 99 %   O2 Device None (Room air)       Current Vitals:   Vital Signs  BP: 149/83  Pulse: 74  Resp: 18  Temp: 97.4 °F (36.3 °C)  Temp src: Oral    Oxygen Therapy  SpO2: 99 %  O2 Device: None (Room air)        Physical Exam  Vitals and nursing note reviewed.   Constitutional:       Appearance:  Normal appearance.   HENT:      Head: Normocephalic.      Right Ear: Tympanic membrane normal.      Left Ear: Tympanic membrane normal.      Nose: Nose normal.      Mouth/Throat:      Mouth: Mucous membranes are moist.   Eyes:      General: Lids are normal. Lids are everted, no foreign bodies appreciated.      Extraocular Movements: Extraocular movements intact.      Conjunctiva/sclera:      Right eye: Right conjunctiva is injected.      Pupils: Pupils are equal, round, and reactive to light.   Cardiovascular:      Rate and Rhythm: Normal rate.   Pulmonary:      Effort: Pulmonary effort is normal.      Breath sounds: Normal breath sounds.   Skin:     General: Skin is warm and dry.   Neurological:      Mental Status: She is alert and oriented to person, place, and time.             ED Course   Labs Reviewed - No data to display                MDM     Please note that this report has been produced using speech recognition software and may contain errors related to that system including, but not limited to, errors in grammar, punctuation, and spelling, as well as words and phrases that possibly may have been recognized inappropriately.  If there are any questions or concerns, contact the dictating provider for clarification.              Medical Decision Making  Differential diagnosis includes but is not limited to: Long COVID, bronchitis, reactive airway, conjunctivitis, shingles, candidiasis, fungal infection, cellulitis      Presents today with couple issues.  First has had ongoing feelings of fatigue with worsening cough.  Patient was diagnosed with COVID-19 about 10 days ago and has had symptoms for about 12 days.  Lungs were clear on exam.  Chest x-ray shows no consolidation acute findings.  Patient also reports rash under the breast which on assessment does appear to have be yeast in nature.  Will treat with clotrimazole cream to apply as directed.  Skin care instructions were given.  Patient also reports pain  to the right eye and behind the eye with redness to the eye.  Does have a history of ocular shingles in the past.  Denies any obvious lesions at this time.  Will start patient on Valtrex to take as directed.  Patient instructed to follow-up with ophthalmology as soon as possible.  To go directly to the ER with any changes in vision.  Encouraged to continue take over-the-counter antihistamine cough suppressant.  Follow-up with her primary care physician in 1 week if symptoms do not improve.  Patient verbalized understanding and agreed to plan of care.    Amount and/or Complexity of Data Reviewed  Radiology: ordered and independent interpretation performed. Decision-making details documented in ED Course.     Details: Chest x-ray    Risk  OTC drugs.  Prescription drug management.        Disposition and Plan     Clinical Impression:  1. Acute cough    2. Candidosis of skin    3. Pain of right eye         Disposition:  Discharge  4/4/2025 11:05 am    Follow-up:  Kriss Josue MD  [address] [address]  [phone]    In 1 week  As needed    [facility]  [address]  [facility] [address] [phone]  Schedule an appointment as soon as possible for a visit             Medications Prescribed:  Current Discharge Medication List        START taking these medications    Details   clotrimazole 1 % External Cream Apply 1 Application topically 2 (two) times daily for 14 days.  Qty: 85 g, Refills: 0      predniSONE 20 MG Oral Tab Take 2 tablets (40 mg total) by mouth daily for 5 days.  Qty: 10 tablet, Refills: 0      !! valACYclovir 500 MG Oral Tab Take 1 tablet (500 mg total) by mouth 2 (two) times daily for 3 days.  Qty: 6 tablet, Refills: 0       !! - Potential duplicate medications found. Please discuss with provider.              Supplementary Documentation:

## 2025-04-04 NOTE — DISCHARGE INSTRUCTIONS
Take valacyclovir as prescribed for possible early orbital shingles.  Call ophthalmology today for an appointment for further evaluation of possible shingles to the eye.  Apply clotrimazole cream twice a day until rash has been completely gone for 2 full days.  If symptoms do not improve after 10 to 14 days follow-up with your primary care physician or dermatology.  Take prednisone in the morning with food to help with inflammation of the eye as well as to treat your bronchitis.  Use your albuterol inhaler as needed for any shortness of breath or persistent cough.  Take over-the-counter antihistamine cough suppressant as needed.

## 2025-04-04 NOTE — ED INITIAL ASSESSMENT (HPI)
Patient was recently diagnosed with Covid on 03/26/25. Patient is still c/o fatigue, eye dryness/pain, cough and possible yeast infection underneath her breasts.

## 2025-04-14 ENCOUNTER — HOSPITAL ENCOUNTER (OUTPATIENT)
Age: 65
Discharge: HOME | End: 2025-04-14
Payer: MEDICARE

## 2025-04-14 VITALS
DIASTOLIC BLOOD PRESSURE: 81 MMHG | BODY MASS INDEX: 38.98 KG/M2 | OXYGEN SATURATION: 97 % | SYSTOLIC BLOOD PRESSURE: 129 MMHG | HEART RATE: 78 BPM | HEIGHT: 63 IN | TEMPERATURE: 98 F | WEIGHT: 220 LBS | RESPIRATION RATE: 16 BRPM

## 2025-04-14 DIAGNOSIS — R20.8 BURNING SENSATION OF MOUTH: ICD-10-CM

## 2025-04-14 DIAGNOSIS — B34.9 VIRAL SYNDROME: Primary | ICD-10-CM

## 2025-04-14 LAB
POCT INFLUENZA A: NEGATIVE
POCT INFLUENZA B: NEGATIVE

## 2025-04-14 PROCEDURE — 87502 INFLUENZA DNA AMP PROBE: CPT | Performed by: PHYSICIAN ASSISTANT

## 2025-04-14 PROCEDURE — 99213 OFFICE O/P EST LOW 20 MIN: CPT | Performed by: PHYSICIAN ASSISTANT

## 2025-04-14 RX ORDER — PHENTERMINE HYDROCHLORIDE 8 MG/1
1 TABLET ORAL 2 TIMES DAILY
COMMUNITY
Start: 2024-10-24

## 2025-04-14 NOTE — ED PROVIDER NOTES
Patient Seen in: [facility]    History     Chief Complaint   Patient presents with    Mouth/Lip Problem     Stated Complaint: Burning Mouth: Sore Throat: Weak and Achy    Subjective:   HPI    65-year-old female with below stated past medical history presents to immediate care for evaluation of burning sensation to mouth starting 3 to 4 days ago, improved today.  Endorses concern for thrush.  She also reports fatigue, subjective fever, postnasal drip, intermittent cough and diarrhea for the past 3 days. Approximately 5 or 6 episodes of watery stools daily. Denies bloody or mucoid stools. Denies chest pain or SOB.  COVID positive in March 2025.       Objective:     Past Medical History:    Anesthesia complication    Anxiety state, unspecified    xanax    ASTHMA    Has not needed inhaler in over a year    Asthma (HCC)    Depression    Difficult intubation    Dizziness and giddiness    unknown etiology of vertigo    Essential hypertension    Fibromyalgia    Hematuria    High blood pressure    Hx of diseases NEC     h/o parvovirus b 19    Hx of diseases NEC    optical herpes    HYPERTENSION    Migraine, unspecified, without mention of intractable migraine without mention of status migrainosus    Migraines    OBESITY    Obesity, unspecified    OSTEOPOROSIS    PONV (postoperative nausea and vomiting)    Sepsis (HCC)    Spinal stenosis    Undiagnosed cardiac murmurs    Unspecified sleep apnea    has C-Pap but doesn't use it.    Valvular disease    pt states leaking valves /no problems    Vertigo    Visual impairment    glasses              Past Surgical History:   Procedure Laterality Date    Appendectomy  1962    Cholecystectomy      Colonoscopy  2008    Colonoscopy,diagnostic  4/3/08    Performed by ALIVIA ESCOBEDO at [facility], [facility]    D & c  10/5/15    hysteroscopy D&C    Injection, w/wo contrast, dx/therapeutic substance, epidural/subarachnoid; cervical/thoracic N/A 3/11/2016    Procedure: CERVICAL  EPIDURAL;  Surgeon: Herminio Arboleda MD;  Location: [facility] PAIN MANAG[facility]    Injection, w/wo contrast, dx/therapeutic substance, epidural/subarachnoid; cervical/thoracic N/A 4/4/2016    Procedure: CERVICAL EPIDURAL;  Surgeon: Herminio Arboleda MD;  Location: [facility] PAIN MANA[facility]    Injection, w/wo contrast, dx/therapeutic substance, epidural/subarachnoid; cervical/thoracic N/A 5/13/2016    Procedure: CERVICAL EPIDURAL;  Surgeon: Herminio Arboleda MD;  Location: [facility] PAIN [facility]    Knee replacement surgery  7/25/13    Right TKR by Lombardi    Orthopedic surg ([facility])  7/25/13    RIGHT KNEE REPLACEMENT    Other surgical history  11-15-12 [facility]    Exc recurrent lipoma on back    Other surgical history      bladder tumor    Other surgical history      gastric bypass    Other surgical history      ganglion left wrist    Other surgical history      meniscus pt cannot recall which knee    Other surgical history  06/20/14    Cystoscopy - Dr. Glez     Other surgical history  04/05/2021    Cystoscopy w/ Dr Dove    Other surgical history  06/01/2021    Cysto Stent removal Dr. Wright     Patient documented not to have experienced any of the following events N/A 3/11/2016    Procedure: CERVICAL EPIDURAL;  Surgeon: Herminio Arboleda MD;  Location: [facility] PAIN [facility]    Patient documented not to have experienced any of the following events N/A 4/4/2016    Procedure: CERVICAL EPIDURAL;  Surgeon: Herminio Arboleda MD;  Location: [facility] PAIN [facility]    Patient documented not to have experienced any of the following events N/A 5/13/2016    Procedure: CERVICAL EPIDURAL;  Surgeon: Herminio Arboleda MD;  Location: [facility] PAIN [facility]NA[facility]    Patient withough preoperative order for iv antibiotic surgical site infection prophylaxis. N/A 3/11/2016    Procedure: CERVICAL EPIDURAL;  Surgeon: Herminio Arboleda MD;  Location: [facility] PAIN [facility]    Patient withough preoperative  order for iv antibiotic surgical site infection prophylaxis. N/A 4/4/2016    Procedure: CERVICAL EPIDURAL;  Surgeon: Herminio Arboleda MD;  Location: [facility] FOR PAIN MANAGEMENT    Patient withough preoperative order for iv antibiotic surgical site infection prophylaxis. N/A 5/13/2016    Procedure: CERVICAL EPIDURAL;  Surgeon: Herminio Arboleda MD;  Location: [facility] PAIN [facility]    Removal gallbladder      Removal of tonsils,12+ y/o      Special service or report      appendectomy    Special service or report      gastric bypass sx    Special service or report      back cyst    Special service or report      ganglion wrist cyst    Special service or report      resection of bladder tumor    Tonsillectomy      Upper gi endoscopy,diagnosis  4/3/08    Performed by ALIVIA ESCOBEDO at [facility] [place], [facility]                Social History     Socioeconomic History    Marital status: Legally     Number of children: 3   Occupational History    Occupation: disability   Tobacco Use    Smoking status: Never     Passive exposure: Never    Smokeless tobacco: Never   Vaping Use    Vaping status: Never Used   Substance and Sexual Activity    Alcohol use: Not Currently    Drug use: No    Sexual activity: Yes     Partners: Male   Other Topics Concern    Caffeine Concern Yes     Comment: 3-4 diet pop a day    Exercise No   Social History Narrative    .  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 First  gone. 1d- attila 34- [place] on her own and has 4 children - 13,12,10, 7 unmarried and father is in [employer] for 27 more years. Single parent and on public aid and food stamps and dropped out of school to be  ; 1s-rafael-33 [place]  and lives in [place]-  to labor and delivery nurse- 3 children- 4- boy hearing loss,2,1 ; 1s- 16 gloria senior at [employer]- honor school and honors student and 3 sport  athlete and wants [place] and tore collateral ligament of right elbow ; 5 stepchildren and 3 still alive. On disability since 1996. No exercise - stretches and walks. She lives in [place] in house she bought at age 17.  She lives with her boyfriend.      Social Drivers of Health     Food Insecurity: Food Insecurity Present (8/2/2024)    Received from [facility]    Hunger Vital Sign     Worried About Running Out of Food in the Last Year: Sometimes true     Ran Out of Food in the Last Year: Sometimes true   Transportation Needs: Unmet Transportation Needs (8/2/2024)    Received from [facility]    PRAPARE - Transportation     Lack of Transportation (Medical): Yes     Lack of Transportation (Non-Medical): Yes   Housing Stability: Low Risk  (8/2/2024)    Received from [facility]    Housing Stability Vital Sign     Unable to Pay for Housing in the Last Year: No     Number of Times Moved in the Last Year: 1     Homeless in the Last Year: No              Review of Systems    Positive for stated complaint: Burning Mouth: Sore Throat: Weak and Achy  Other systems are as noted in HPI.  Constitutional and vital signs reviewed.      All other systems reviewed and negative except as noted above.    Physical Exam     ED Triage Vitals [04/14/25 1444]   BP (!) 131/92   Pulse 78   Resp 16   Temp 97.6 °F (36.4 °C)   Temp src Oral   SpO2 97 %   O2 Device None (Room air)       Current Vitals:   Vital Signs  BP: 129/81  Pulse: 78  Resp: 16  Temp: 97.6 °F (36.4 °C)  Temp src: Oral    Oxygen Therapy  SpO2: 97 %  O2 Device: None (Room air)        Physical Exam  Vitals and nursing note reviewed.   Constitutional:       General: She is not in acute distress.     Appearance: Normal appearance. She is not ill-appearing, toxic-appearing or diaphoretic.   HENT:      Mouth/Throat:      Mouth: Mucous membranes are moist. No oral lesions.      Tongue: No lesions.      Pharynx: Oropharynx is clear.   Cardiovascular:      Rate  and Rhythm: Normal rate.      Heart sounds: Normal heart sounds.   Pulmonary:      Effort: Pulmonary effort is normal. No respiratory distress.      Breath sounds: Normal breath sounds. No wheezing.   Neurological:      Mental Status: She is alert and oriented to person, place, and time.   Psychiatric:         Behavior: Behavior normal.             ED Course     Labs Reviewed   POCT FLU TEST - Normal    Narrative:     This assay is a rapid molecular in vitro test utilizing nucleic acid amplification of influenza A and B viral RNA.           MDM      Differential diagnosis considered but not limited to viral syndrome, bacterial etiology, thrush, other    Patient is afebrile and nontoxic in appearance.  Oropharynx is moist and clear.  No lesions/plaques to suggest thrush.  Physical exam is reassuring and consistent with viral syndrome.  Supportive care discussed. Return for worsening symptoms.      Medical Decision Making  Amount and/or Complexity of Data Reviewed  Labs: ordered. Decision-making details documented in ED Course.    Risk  OTC drugs.        Disposition and Plan     Clinical Impression:  1. Viral syndrome    2. Burning sensation of mouth         Disposition:  Discharge  4/14/2025  3:26 pm    Follow-up:  No follow-up provider specified.        Medications Prescribed:  Discharge Medication List as of 4/14/2025  3:35 PM          Supplementary Documentation:

## 2025-04-14 NOTE — ED INITIAL ASSESSMENT (HPI)
Patient c/o sore mouth and burning to mouth with concerns for thrush, also reporting fatigue, intermittent sweating, post nasal drip with intermittent cough/scratchy throat, and diarrhea, unsure of fever, denies SOB, symptoms started 3-4 days ago

## (undated) DEVICE — CYSTO CDS-LF: Brand: MEDLINE INDUSTRIES, INC.

## (undated) DEVICE — THYROID: Brand: MEDLINE INDUSTRIES, INC.

## (undated) DEVICE — TIGERTAIL 5F FLXTIP 70CM

## (undated) DEVICE — 3M™ STERI-STRIP™ REINFORCED ADHESIVE SKIN CLOSURES, R1547, 1/2 IN X 4 IN (12 MM X 100 MM), 6 STRIPS/ENVELOPE: Brand: 3M™ STERI-STRIP™

## (undated) DEVICE — SUTURE MONOCRYL 4-0 PS-2

## (undated) DEVICE — STERILE SYNTHETIC POLYISOPRENE POWDER-FREE SURGICAL GLOVES WITH HYDROGEL COATING: Brand: PROTEXIS

## (undated) DEVICE — NITINOL STONE RETRIEVAL BASKET: Brand: ZERO TIP

## (undated) DEVICE — SUTURE SILK 3-0

## (undated) DEVICE — SOL  .9 3000ML

## (undated) DEVICE — SCD SLEEVE KNEE HI BLEND

## (undated) DEVICE — Device

## (undated) DEVICE — ABSORBABLE HEMOSTAT (OXIDIZED REGENERATED CELLULOSE, U.S.P.): Brand: SURGICEL

## (undated) DEVICE — SOL  .9 1000ML BTL

## (undated) DEVICE — SUTURE VICRYL 3-0 SH

## (undated) DEVICE — SPONGE: SPECIALTY PEANUT XR 100/CS: Brand: MEDICAL ACTION INDUSTRIES

## (undated) DEVICE — STERILE POLYISOPRENE POWDER-FREE SURGICAL GLOVES: Brand: PROTEXIS

## (undated) DEVICE — 3M(TM) MICROPORE TAPE DISPENSER 1535-2: Brand: 3M™ MICROPORE™

## (undated) DEVICE — ZIPWIRE GUIDEWIRE .038X150 STR

## (undated) NOTE — LETTER
Amina Kirk 89  [address] [facility] [facility] [address] [facility]  Authorization for Surgical Operation and Procedure     Date:___________                                                                                                         Time:__________ transfusion and/or blood products.   The following are some, but not all, of the potential risks that can occur: fever and allergic reactions, hemolytic reactions, transmission of diseases such as Hepatitis, AIDS and Cytomegalovirus (CMV) and fluid overload person authorized to consent on my behalf). The surgeon or my attending physician will determine when the applicable recovery period ends for purposes of reinstating the DNAR order.   10. Patients having a sterilization procedure: I understand that if the p asking for anesthesia services, I agree to:  a. Allow the anesthesiologist (anesthesia doctor) to give me medicine and do additional procedures as necessary.  Some examples are: Starting or using an “IV” to give me medicine, fluids or blood during my proced nerve injury. 7. Regional Anesthesia (“spinal”, “epidural”, & “nerve blocks”): I understand that rare but potential complications include headache, bleeding, infection, seizure, irregular heart rhythms, and nerve injury.     I can change my mind about hav

## (undated) NOTE — Clinical Note
Shana Salazar was seen at the [facility] for consultation today. I have ordered labs, set up a nutrition consultation with our dietician, and completed an EKG.   She was started on Metformin for medication therapy and will follow-up with me in 1 mo

## (undated) NOTE — LETTER
Amina Kirk 89  [address] [facility]  Authorization for Surgical Operation and Procedure     Date:___________                                                                                                         Time:__________ transfusion and/or blood products.   The following are some, but not all, of the potential risks that can occur: fever and allergic reactions, hemolytic reactions, transmission of diseases such as Hepatitis, AIDS and Cytomegalovirus (CMV) and fluid overload person authorized to consent on my behalf). The surgeon or my attending physician will determine when the applicable recovery period ends for purposes of reinstating the DNAR order.   10. Patients having a sterilization procedure: I understand that if the p asking for anesthesia services, I agree to:  a. Allow the anesthesiologist (anesthesia doctor) to give me medicine and do additional procedures as necessary.  Some examples are: Starting or using an “IV” to give me medicine, fluids or blood during my proced nerve injury. 7. Regional Anesthesia (“spinal”, “epidural”, & “nerve blocks”): I understand that rare but potential complications include headache, bleeding, infection, seizure, irregular heart rhythms, and nerve injury.     I can change my mind about hav

## (undated) NOTE — Clinical Note
Patient was seen for their 1 month f/u at [facility] with a total weight loss of 3# since initial consult.